# Patient Record
Sex: MALE | Race: WHITE | Employment: PART TIME | URBAN - NONMETROPOLITAN AREA
[De-identification: names, ages, dates, MRNs, and addresses within clinical notes are randomized per-mention and may not be internally consistent; named-entity substitution may affect disease eponyms.]

---
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
## 2020-01-03 ENCOUNTER — TELEPHONE (OUTPATIENT)
Dept: INTERNAL MEDICINE | Age: 20
End: 2020-01-03

## 2020-01-03 NOTE — TELEPHONE ENCOUNTER
Called patient to try to get a records release signed, but the number on file [phone] said it was not a working number. Called the number listed under [employer] he is listed as a guardian and left message for him to sign a records release.

## 2020-03-14 ENCOUNTER — HOSPITAL ENCOUNTER (EMERGENCY)
Age: 20
Discharge: HOME | End: 2020-03-14

## 2020-03-14 VITALS
OXYGEN SATURATION: 96 % | BODY MASS INDEX: 33.86 KG/M2 | SYSTOLIC BLOOD PRESSURE: 134 MMHG | DIASTOLIC BLOOD PRESSURE: 82 MMHG | HEIGHT: 72 IN | TEMPERATURE: 98.1 F | WEIGHT: 250 LBS | RESPIRATION RATE: 17 BRPM | HEART RATE: 86 BPM

## 2020-03-14 LAB
AMPHETAMINE SCREEN, URINE: NEGATIVE
BARBITURATE SCREEN URINE: NEGATIVE
BENZODIAZEPINE SCREEN, URINE: NEGATIVE
CANNABINOID SCREEN URINE: POSITIVE
COCAINE METABOLITE SCREEN URINE: NEGATIVE
Lab: ABNORMAL
OPIATE SCREEN URINE: NEGATIVE

## 2020-03-14 PROCEDURE — 80307 DRUG TEST PRSMV CHEM ANLYZR: CPT

## 2020-03-14 PROCEDURE — 99283 EMERGENCY DEPT VISIT LOW MDM: CPT

## 2020-03-14 PROCEDURE — 6370000000 HC RX 637 (ALT 250 FOR IP): Performed by: NURSE PRACTITIONER

## 2020-03-14 RX ORDER — BACLOFEN 10 MG/1
10 TABLET ORAL 2 TIMES DAILY
Qty: 6 TABLET | Refills: 0 | Status: SHIPPED | OUTPATIENT
Start: 2020-03-14 | End: 2020-03-17

## 2020-03-14 RX ORDER — ONDANSETRON 4 MG/1
4 TABLET, ORALLY DISINTEGRATING ORAL ONCE
Status: COMPLETED | OUTPATIENT
Start: 2020-03-14 | End: 2020-03-14

## 2020-03-14 RX ORDER — SUCRALFATE ORAL 1 G/10ML
1 SUSPENSION ORAL 4 TIMES DAILY
Qty: 1200 ML | Refills: 3 | Status: SHIPPED | OUTPATIENT
Start: 2020-03-14 | End: 2020-03-14 | Stop reason: SDUPTHER

## 2020-03-14 RX ORDER — SUCRALFATE ORAL 1 G/10ML
1 SUSPENSION ORAL 4 TIMES DAILY
Qty: 560 ML | Refills: 0 | Status: SHIPPED | OUTPATIENT
Start: 2020-03-14 | End: 2021-03-03 | Stop reason: ALTCHOICE

## 2020-03-14 RX ORDER — LORAZEPAM 1 MG/1
1 TABLET ORAL ONCE
Status: DISCONTINUED | OUTPATIENT
Start: 2020-03-14 | End: 2020-03-14 | Stop reason: HOSPADM

## 2020-03-14 RX ORDER — METOCLOPRAMIDE 10 MG/1
10 TABLET ORAL ONCE
Status: COMPLETED | OUTPATIENT
Start: 2020-03-14 | End: 2020-03-14

## 2020-03-14 RX ADMIN — ONDANSETRON 4 MG: 4 TABLET, ORALLY DISINTEGRATING ORAL at 10:08

## 2020-03-14 RX ADMIN — METOCLOPRAMIDE 10 MG: 10 TABLET ORAL at 10:08

## 2020-03-14 ASSESSMENT — PAIN SCALES - GENERAL: PAINLEVEL_OUTOF10: 6

## 2020-03-14 ASSESSMENT — ENCOUNTER SYMPTOMS: ABDOMINAL PAIN: 0

## 2020-03-14 NOTE — ED PROVIDER NOTES
[facility] EM[facility] DEPT  eMERGENCY dEPARTMENT eNCOUnter      Pt Name: Juanjose Johnson  MRN: [id number]  Cirilogfmiguel angel 2000  Date of evaluation: 3/14/2020  Provider: Flora Yee, [address]       Chief Complaint   Patient presents with    Hiccups     chronic         HISTORY OF PRESENT ILLNESS   (Location/Symptom, Timing/Onset,Context/Setting, Quality, Duration, Modifying Factors, Severity)  Note limiting factors. Audelia Moody a 23 y.o. male who presents to the emergency department for evaluation of hiccups. Pt tells me that he has had intermittent problems with hiccups since over past couple of years. He tells me that he last experienced problems with hiccups 6 months ago. He tells me that over past 4 days he has had return in problem with hiccups. He relates that the hiccups are causing problems with sleep. He tells me that he as been drinking fluids but hasn't been eating much due to hiccups. He has had indigestion and sore throat associated with this problem. He takes omperazole 40mg daily. He tells me that he thinks he had evaluation by GI 2 years ago but isn't sure. He denies fevers, cough as well as shortness of breath. He tells me that he is anxious. He denies presentation for mental health concerns specifically hallucinations/suicidal thoughts. Pt tells me that Carafate has improved symptoms in the past.   HPI    Nursing Notes were reviewed. REVIEW OF SYSTEMS    (2-9 systems for level 4, 10 or more for level 5)     Review of Systems   Constitutional: Negative. Gastrointestinal: Negative for abdominal pain. Hiccups   Psychiatric/Behavioral: The patient is nervous/anxious. A complete review of systems was performed and is negative except as noted above in the HPI. PAST MEDICAL HISTORY   History reviewed. No pertinent past medical history. SURGICAL HISTORY     History reviewed. No pertinent surgical history.       CURRENT MEDICATIONS       Previous Medications 03/14/2020 11:42:36 AM      PATIENT REFERRED TO:  Owen Everett MD  [address]  [phone]    Schedule an appointment as soon as possible for a visit         DISCHARGE MEDICATIONS:       Current Discharge Medication List      CONTINUE these medications which have CHANGED    Details   sucralfate (CARAFATE) 1 GM/10ML suspension Take 10 mLs by mouth 4 times daily for 14 days  Qty: 560 mL, Refills: 0              Medication List      START taking these medications    baclofen 10 MG tablet  Commonly known as:  LIORESAL  Take 1 tablet by mouth 2 times daily for 3 days     sucralfate 1 GM/10ML suspension  Commonly known as:  Carafate  Take 10 mLs by mouth 4 times daily for 14 days        ASK your doctor about these medications    esomeprazole 40 MG delayed release capsule  Commonly known as:  NEXIUM  Take 1 capsule by mouth every morning (before breakfast)           Where to Get Your Medications      You can get these medications from any pharmacy    Bring a paper prescription for each of these medications  · baclofen 10 MG tablet  · sucralfate 1 GM/10ML suspension         (Pleasenote that portions of this note were completed with a voice recognition program.  Efforts were made to edit the dictations but occasionally words are mis-transcribed.)             Valdez Obando, APRN  03/14/20 [id number]

## 2021-03-03 ENCOUNTER — OFFICE VISIT (OUTPATIENT)
Dept: FAMILY MEDICINE CLINIC | Age: 21
End: 2021-03-03
Payer: MEDICAID

## 2021-03-03 ENCOUNTER — HOSPITAL ENCOUNTER (OUTPATIENT)
Dept: GENERAL RADIOLOGY | Age: 21
Discharge: HOME | End: 2021-03-03
Payer: MEDICAID

## 2021-03-03 VITALS
DIASTOLIC BLOOD PRESSURE: 60 MMHG | OXYGEN SATURATION: 97 % | SYSTOLIC BLOOD PRESSURE: 102 MMHG | WEIGHT: 209.4 LBS | HEIGHT: 72 IN | TEMPERATURE: 97 F | HEART RATE: 81 BPM | RESPIRATION RATE: 16 BRPM | BODY MASS INDEX: 28.36 KG/M2

## 2021-03-03 DIAGNOSIS — R06.6 HICCUPS: ICD-10-CM

## 2021-03-03 DIAGNOSIS — F41.9 ANXIETY: ICD-10-CM

## 2021-03-03 DIAGNOSIS — M54.50 LOW BACK PAIN, UNSPECIFIED BACK PAIN LATERALITY, UNSPECIFIED CHRONICITY, UNSPECIFIED WHETHER SCIATICA PRESENT: ICD-10-CM

## 2021-03-03 DIAGNOSIS — Z13.220 SCREENING FOR HYPERLIPIDEMIA: ICD-10-CM

## 2021-03-03 LAB
ALBUMIN SERPL-MCNC: 4.5 G/DL (ref 3.5–5.2)
ALP BLD-CCNC: 89 U/L (ref 40–130)
ALT SERPL-CCNC: 27 U/L (ref 5–41)
ANION GAP SERPL CALCULATED.3IONS-SCNC: 10 MMOL/L (ref 7–19)
AST SERPL-CCNC: 14 U/L (ref 5–40)
BILIRUB SERPL-MCNC: 1.2 MG/DL (ref 0.2–1.2)
BILIRUBIN DIRECT: 0.2 MG/DL (ref 0–0.3)
BILIRUBIN, INDIRECT: 1 MG/DL (ref 0.1–1)
BUN BLDV-MCNC: 11 MG/DL (ref 6–20)
CALCIUM SERPL-MCNC: 9.6 MG/DL (ref 8.6–10)
CHLORIDE BLD-SCNC: 105 MMOL/L (ref 98–111)
CHOLESTEROL, TOTAL: 139 MG/DL (ref 160–199)
CO2: 27 MMOL/L (ref 22–29)
CREAT SERPL-MCNC: 0.9 MG/DL (ref 0.5–1.2)
GFR AFRICAN AMERICAN: >59
GFR NON-AFRICAN AMERICAN: >60
GLUCOSE BLD-MCNC: 78 MG/DL (ref 74–109)
HCT VFR BLD CALC: 46.4 % (ref 42–52)
HDLC SERPL-MCNC: 33 MG/DL (ref 55–121)
HEMOGLOBIN: 15.2 G/DL (ref 14–18)
LDL CHOLESTEROL CALCULATED: 80 MG/DL
MCH RBC QN AUTO: 28.6 PG (ref 27–31)
MCHC RBC AUTO-ENTMCNC: 32.8 G/DL (ref 33–37)
MCV RBC AUTO: 87.2 FL (ref 80–94)
PDW BLD-RTO: 13 % (ref 11.5–14.5)
PLATELET # BLD: 235 K/UL (ref 130–400)
PMV BLD AUTO: 11 FL (ref 9.4–12.4)
POTASSIUM SERPL-SCNC: 4.3 MMOL/L (ref 3.5–5)
RBC # BLD: 5.32 M/UL (ref 4.7–6.1)
SODIUM BLD-SCNC: 142 MMOL/L (ref 136–145)
T4 FREE: 1.29 NG/DL (ref 0.93–1.7)
TOTAL PROTEIN: 6.7 G/DL (ref 6.6–8.7)
TRIGL SERPL-MCNC: 132 MG/DL (ref 0–149)
TSH SERPL DL<=0.05 MIU/L-ACNC: 0.98 UIU/ML (ref 0.27–4.2)
WBC # BLD: 4.9 K/UL (ref 4.8–10.8)

## 2021-03-03 PROCEDURE — 71046 X-RAY EXAM CHEST 2 VIEWS: CPT

## 2021-03-03 PROCEDURE — 99203 OFFICE O/P NEW LOW 30 MIN: CPT | Performed by: FAMILY MEDICINE

## 2021-03-03 PROCEDURE — 72100 X-RAY EXAM L-S SPINE 2/3 VWS: CPT

## 2021-03-03 RX ORDER — PANTOPRAZOLE SODIUM 40 MG/1
40 TABLET, DELAYED RELEASE ORAL DAILY
Qty: 30 TABLET | Refills: 5 | Status: SHIPPED | OUTPATIENT
Start: 2021-03-03 | End: 2022-03-26

## 2021-03-03 RX ORDER — SUCRALFATE 1 G/1
1 TABLET ORAL 4 TIMES DAILY
Qty: 120 TABLET | Refills: 5 | Status: SHIPPED | OUTPATIENT
Start: 2021-03-03 | End: 2022-03-26

## 2021-03-03 ASSESSMENT — ENCOUNTER SYMPTOMS
CONSTIPATION: 0
NAUSEA: 0
BLOOD IN STOOL: 0
BACK PAIN: 1
CHEST TIGHTNESS: 0
DIARRHEA: 0
VOMITING: 0
SHORTNESS OF BREATH: 0
EYES NEGATIVE: 1
COUGH: 0
WHEEZING: 0

## 2021-03-03 ASSESSMENT — PATIENT HEALTH QUESTIONNAIRE - PHQ9
SUM OF ALL RESPONSES TO PHQ QUESTIONS 1-9: 0
SUM OF ALL RESPONSES TO PHQ9 QUESTIONS 1 & 2: 0
SUM OF ALL RESPONSES TO PHQ QUESTIONS 1-9: 0

## 2021-03-03 NOTE — PROGRESS NOTES
this issue as long as he can remember. He does see Dr. David Patel at [facility]. Apparently he has had some x-rays there but his father suggested to him that we should do some x-rays for him as well. I therefore I am going to get LS spine x-ray as well as a chest x-ray. He was told previously that he does have degenerative disc disease in his low back. He states that he has not had lab for quite some time. He did also mention that he is not a big fan of needles and not wrestled with the idea of whether to do the lab or not. He finally decided to go ahead and have it since he has not eaten and we will do CBC, BMP, lipids, free T4, TSH, urinalysis, and hepatic function panel. Review of Systems   Constitutional: Negative. HENT: Negative. Eyes: Negative. Respiratory: Negative for cough, chest tightness, shortness of breath and wheezing. Cardiovascular: Negative for chest pain, palpitations and leg swelling. Gastrointestinal: Negative for blood in stool, constipation, diarrhea, nausea and vomiting. Genitourinary: Negative for hematuria. Musculoskeletal: Positive for arthralgias and back pain. Skin: Negative. Neurological: Negative. She complains of refractory hiccups but he was not noted to hiccup 1 single time while he was present here in the office today. Psychiatric/Behavioral: Negative. Objective:   Physical Exam  Vitals signs and nursing note reviewed. Constitutional:       General: He is not in acute distress. Appearance: Normal appearance. He is well-developed. He is not ill-appearing, toxic-appearing or diaphoretic. HENT:      Head: Normocephalic and atraumatic. Right Ear: Tympanic membrane, ear canal and external ear normal. There is no impacted cerumen. Left Ear: Tympanic membrane, ear canal and external ear normal. There is no impacted cerumen. Nose: Nose normal.      Mouth/Throat:      Lips: Pink.       Mouth: Mucous membranes are moist.      Dentition: Normal dentition. Tongue: No lesions. Pharynx: Oropharynx is clear. Uvula midline. Tonsils: No tonsillar exudate or tonsillar abscesses. Eyes:      General: Lids are normal. No scleral icterus. Right eye: No discharge. Left eye: No discharge. Extraocular Movements:      Right eye: Normal extraocular motion. Left eye: Normal extraocular motion. Conjunctiva/sclera: Conjunctivae normal.      Right eye: Right conjunctiva is not injected. Left eye: Left conjunctiva is not injected. Pupils: Pupils are equal, round, and reactive to light. Neck:      Musculoskeletal: Normal range of motion and neck supple. No neck rigidity or muscular tenderness. Thyroid: No thyromegaly. Vascular: No carotid bruit or JVD. Cardiovascular:      Rate and Rhythm: Normal rate and regular rhythm. Pulses:           Carotid pulses are 2+ on the right side and 2+ on the left side. Radial pulses are 2+ on the right side and 2+ on the left side. Heart sounds: Normal heart sounds, S1 normal and S2 normal. No murmur. No friction rub. No gallop. Pulmonary:      Effort: Pulmonary effort is normal. No accessory muscle usage or respiratory distress. Breath sounds: Normal breath sounds. No stridor. No wheezing, rhonchi or rales. Chest:      Chest wall: No tenderness. Abdominal:      General: Bowel sounds are normal. There is no distension or abdominal bruit. Palpations: Abdomen is soft. There is no mass. Tenderness: There is no abdominal tenderness. There is no right CVA tenderness, left CVA tenderness, guarding or rebound. Hernia: No hernia is present. Musculoskeletal: Normal range of motion. General: No swelling, tenderness, deformity or signs of injury. Right lower leg: No edema. Left lower leg: No edema. Lymphadenopathy:      Cervical: No cervical adenopathy.       Right cervical: No  T4, Free     Standing Status:   Future     Standing Expiration Date:   3/3/2022    TSH without Reflex     Standing Status:   Future     Standing Expiration Date:   3/3/2022    Urinalysis     Standing Status:   Future     Standing Expiration Date:   3/3/2022    Lipid Panel     Standing Status:   Future     Standing Expiration Date:   3/3/2022     Order Specific Question:   Is Patient Fasting?/# of Hours     Answer:   8    Hepatic Function Panel     Standing Status:   Future     Standing Expiration Date:   3/3/2022     Orders Placed This Encounter   Medications    pantoprazole (PROTONIX) 40 MG tablet     Sig: Take 1 tablet by mouth daily     Dispense:  30 tablet     Refill:  5    sucralfate (CARAFATE) 1 GM tablet     Sig: Take 1 tablet by mouth 4 times daily Ac and hs     Dispense:  120 tablet     Refill:  5             Diana Diamond MD

## 2021-03-05 ENCOUNTER — TELEPHONE (OUTPATIENT)
Dept: FAMILY MEDICINE CLINIC | Age: 21
End: 2021-03-05

## 2021-03-09 ENCOUNTER — OFFICE VISIT (OUTPATIENT)
Dept: FAMILY MEDICINE CLINIC | Age: 21
End: 2021-03-09
Payer: MEDICAID

## 2021-03-09 VITALS
BODY MASS INDEX: 27.77 KG/M2 | HEIGHT: 72 IN | WEIGHT: 205 LBS | HEART RATE: 70 BPM | DIASTOLIC BLOOD PRESSURE: 74 MMHG | TEMPERATURE: 97.2 F | RESPIRATION RATE: 20 BRPM | OXYGEN SATURATION: 98 % | SYSTOLIC BLOOD PRESSURE: 106 MMHG

## 2021-03-09 DIAGNOSIS — R06.6 INTRACTABLE HICCUPS: ICD-10-CM

## 2021-03-09 DIAGNOSIS — M54.50 ACUTE MIDLINE LOW BACK PAIN WITHOUT SCIATICA: ICD-10-CM

## 2021-03-09 PROCEDURE — 99213 OFFICE O/P EST LOW 20 MIN: CPT | Performed by: FAMILY MEDICINE

## 2021-03-09 ASSESSMENT — ENCOUNTER SYMPTOMS
COUGH: 0
DIARRHEA: 0
SHORTNESS OF BREATH: 0
BLOOD IN STOOL: 0
VOMITING: 0
NAUSEA: 0
CONSTIPATION: 0
WHEEZING: 0
BACK PAIN: 1
CHEST TIGHTNESS: 0
EYES NEGATIVE: 1

## 2021-03-09 NOTE — PROGRESS NOTES
mg p.o. daily. Additionally I will give him Carafate 1 g before meals and at bedtime. He did not complain of a significant amount of vomiting at this time so I am not doing Zofran for now. In an effort to rule out some mediastinal lesions, we did order a chest x-ray on his way out the office on 3/3/2021 which was reportedly unremarkable. At his initial visit, the patient was quite upset by the fact that his girlfriend was unable to come back in the room with him. [address] [place] [address] is on restricted exposure precautions and except for situations with the child or mentally challenged, patient's are in the room by themselves as a general safety margin. Eventually got over this and decided to proceed with the work-up as his dad had requested him to do. Admittedly gets upset easily and has some component of anxiety.     Additionally, he complains of pain in his low back. He states that he has had this issue as long as he can remember. He does see Dr. Helen Garcia at [facility]. Apparently he has had some x-rays there but his father suggested to him that we should do some x-rays for him as well. I therefore I am going to get LS spine x-ray as well as a chest x-ray. He was told previously that he does have degenerative disc disease in his low back. There were no acute changes either on his back or his chest x-ray done at his visit on 3/3/2021.     Review of Systems   Constitutional: Negative. HENT: Negative. Eyes: Negative. Respiratory: Negative for cough, chest tightness, shortness of breath and wheezing. Recurrent hiccups reported though not witnessed. Gastrointestinal: Negative for blood in stool, constipation, diarrhea, nausea and vomiting. Genitourinary: Negative for hematuria. Musculoskeletal: Positive for back pain. Skin: Negative. Neurological: Negative. Psychiatric/Behavioral: Negative. Objective:   Physical Exam  Vitals signs and nursing note reviewed. Constitutional:       General: He is not in acute distress. Appearance: Normal appearance. He is well-developed. He is not ill-appearing, toxic-appearing or diaphoretic. HENT:      Head: Normocephalic and atraumatic. Right Ear: Tympanic membrane, ear canal and external ear normal. There is no impacted cerumen. Left Ear: Tympanic membrane, ear canal and external ear normal. There is no impacted cerumen. Nose: Nose normal.      Mouth/Throat:      Lips: Pink. Mouth: Mucous membranes are moist.      Dentition: Normal dentition. Tongue: No lesions. Pharynx: Oropharynx is clear. Uvula midline. Tonsils: No tonsillar exudate or tonsillar abscesses. Eyes:      General: Lids are normal. No scleral icterus. Right eye: No discharge. Left eye: No discharge. Extraocular Movements:      Right eye: Normal extraocular motion. Left eye: Normal extraocular motion. Conjunctiva/sclera: Conjunctivae normal.      Right eye: Right conjunctiva is not injected. Left eye: Left conjunctiva is not injected. Pupils: Pupils are equal, round, and reactive to light. Neck:      Musculoskeletal: Normal range of motion and neck supple. No neck rigidity or muscular tenderness. Thyroid: No thyromegaly. Vascular: No carotid bruit or JVD. Cardiovascular:      Rate and Rhythm: Normal rate and regular rhythm. Pulses:           Carotid pulses are 2+ on the right side and 2+ on the left side. Radial pulses are 2+ on the right side and 2+ on the left side. Heart sounds: Normal heart sounds, S1 normal and S2 normal. No murmur. No friction rub. No gallop. Pulmonary:      Effort: Pulmonary effort is normal. No accessory muscle usage or respiratory distress. Breath sounds: Normal breath sounds. No stridor. No wheezing, rhonchi or rales. Chest:      Chest wall: No tenderness.    Abdominal:      General: Bowel sounds are normal. There is no distension or abdominal bruit. Palpations: Abdomen is soft. There is no mass. Tenderness: There is no abdominal tenderness. There is no right CVA tenderness, left CVA tenderness, guarding or rebound. Hernia: No hernia is present. Musculoskeletal: Normal range of motion. General: No swelling, tenderness, deformity or signs of injury. Right lower leg: No edema. Left lower leg: No edema. Lymphadenopathy:      Cervical: No cervical adenopathy. Right cervical: No superficial cervical adenopathy. Left cervical: No superficial cervical adenopathy. Skin:     General: Skin is warm and dry. Nails: There is no clubbing. Neurological:      General: No focal deficit present. Mental Status: He is alert and oriented to person, place, and time. Mental status is at baseline. Cranial Nerves: No facial asymmetry. Motor: No weakness or tremor. Coordination: Coordination normal.      Gait: Gait normal.      Deep Tendon Reflexes: Reflexes are normal and symmetric. Psychiatric:         Attention and Perception: Attention normal.         Mood and Affect: Mood normal.         Speech: Speech normal.         Behavior: Behavior normal.         Thought Content: Thought content normal.         Cognition and Memory: Memory normal.         Judgment: Judgment normal.         /74 (Site: Left Upper Arm, Position: Sitting, Cuff Size: Medium Adult)   Pulse 70   Temp 97.2 °F (36.2 °C) (Oral)   Resp 20   Ht 6' (1.829 m)   Wt 205 lb (93 kg)   SpO2 98%   BMI 27.80 kg/m²   Assessment:       Diagnosis Orders   1. Intractable hiccups      Reported though not witnessed   2. Acute midline low back pain without sciatica           Plan:       I am having Mr. Beryle Chamorro maintain his :   No outpatient medications have been marked as taking for the 3/9/21 encounter (Office Visit) with Joana Kelly MD.     .            Joana Kelly MD

## 2022-03-26 ENCOUNTER — HOSPITAL ENCOUNTER (EMERGENCY)
Age: 22
Discharge: HOME | End: 2022-03-27
Attending: EMERGENCY MEDICINE
Payer: MEDICAID

## 2022-03-26 DIAGNOSIS — L05.01 PILONIDAL ABSCESS: Primary | ICD-10-CM

## 2022-03-26 PROCEDURE — 10081 I&D PILONIDAL CYST COMP: CPT

## 2022-03-26 PROCEDURE — 99152 MOD SED SAME PHYS/QHP 5/>YRS: CPT

## 2022-03-26 PROCEDURE — 99284 EMERGENCY DEPT VISIT MOD MDM: CPT

## 2022-03-27 VITALS
HEART RATE: 90 BPM | DIASTOLIC BLOOD PRESSURE: 81 MMHG | OXYGEN SATURATION: 100 % | HEIGHT: 73 IN | RESPIRATION RATE: 17 BRPM | WEIGHT: 180 LBS | TEMPERATURE: 97.9 F | SYSTOLIC BLOOD PRESSURE: 112 MMHG | BODY MASS INDEX: 23.86 KG/M2

## 2022-03-27 PROCEDURE — 2500000003 HC RX 250 WO HCPCS: Performed by: EMERGENCY MEDICINE

## 2022-03-27 RX ORDER — OXYCODONE AND ACETAMINOPHEN 7.5; 325 MG/1; MG/1
1 TABLET ORAL EVERY 6 HOURS PRN
Qty: 10 TABLET | Refills: 0 | Status: SHIPPED | OUTPATIENT
Start: 2022-03-27 | End: 2022-04-03

## 2022-03-27 RX ORDER — ETOMIDATE 2 MG/ML
10 INJECTION INTRAVENOUS ONCE
Status: DISCONTINUED | OUTPATIENT
Start: 2022-03-27 | End: 2022-03-27 | Stop reason: HOSPADM

## 2022-03-27 RX ORDER — ETOMIDATE 2 MG/ML
INJECTION INTRAVENOUS DAILY PRN
Status: COMPLETED | OUTPATIENT
Start: 2022-03-27 | End: 2022-03-27

## 2022-03-27 RX ORDER — SULFAMETHOXAZOLE AND TRIMETHOPRIM 800; 160 MG/1; MG/1
1 TABLET ORAL 2 TIMES DAILY
Qty: 14 TABLET | Refills: 0 | Status: SHIPPED | OUTPATIENT
Start: 2022-03-27 | End: 2022-04-03

## 2022-03-27 RX ADMIN — ETOMIDATE 10 MG: 2 INJECTION, SOLUTION INTRAVENOUS at 01:10

## 2022-03-27 RX ADMIN — ETOMIDATE 10 MG: 2 INJECTION, SOLUTION INTRAVENOUS at 01:07

## 2022-03-27 ASSESSMENT — ENCOUNTER SYMPTOMS
ABDOMINAL PAIN: 0
VOMITING: 0

## 2022-03-27 NOTE — ED PROVIDER NOTES
[facility]  eMERGENCY dEPARTMENT eNCOUnter      Pt Name: Evonne Lockett  MRN: [id number]  [place] 2000  Date of evaluation: 3/26/2022  Provider: Jayla Murray MD    [address] [place] [address]       Chief Complaint   Patient presents with    Abscess     on tailbone x 1 week         HISTORY OF PRESENT ILLNESS   (Location/Symptom, Timing/Onset,Context/Setting, Quality, Duration, Modifying Factors, Severity)  Note limiting factors. Evonne Lockett is a 24 y.o. male who presents to the emergency department for evaluation regarding swelling and redness over his buttock area for about the past 1 week. Patient states he has a previous history of similar abscesses in this area. He tells me that normally they drain on their own however this 1 seems to be getting progressively worse over the last 1 week with really no improvement in symptoms. He has not been on any oral antibiotics. States has not had any fevers or chills. There have been no relieving factors. HPI    NursingNotes were reviewed. REVIEW OF SYSTEMS    (2-9 systems for level 4, 10 or more for level 5)     Review of Systems   Constitutional: Negative for chills and fever. Gastrointestinal: Negative for abdominal pain and vomiting. Skin: Positive for wound. All other systems reviewed and are negative. PAST MEDICALHISTORY   History reviewed. No pertinent past medical history. SURGICAL HISTORY       Past Surgical History:   Procedure Laterality Date    TONSILLECTOMY           CURRENT MEDICATIONS     Previous Medications    No medications on file       ALLERGIES     Patient has no known allergies. FAMILY HISTORY     History reviewed. No pertinent family history.        SOCIAL HISTORY       Social History     Socioeconomic History    Marital status: Single     Spouse name: None    Number of children: None    Years of education: None    Highest education level: None   Occupational History    None   Tobacco Use    Smoking status: Former Smoker    Smokeless tobacco: Current User     Types: Chew, Snuff   Substance and Sexual Activity    Alcohol use: No    Drug use: No    Sexual activity: None   Other Topics Concern    None   Social History Narrative    None     Social Determinants of Health     Financial Resource Strain:     Difficulty of Paying Living Expenses: Not on file   Food Insecurity:     Worried About Running Out of Food in the Last Year: Not on file    [place] of Food in the Last Year: Not on file   Transportation Needs:     Lack of Transportation (Medical): Not on file    Lack of Transportation (Non-Medical):  Not on file   Physical Activity:     Days of Exercise per Week: Not on file    Minutes of Exercise per Session: Not on file   Stress:     Feeling of Stress : Not on file   Social Connections:     Frequency of Communication with Friends and Family: Not on file    Frequency of Social Gatherings with Friends and Family: Not on file    Attends Scientology Services: Not on file    Active Member of [address] [place] [address] or Organizations: Not on file    Attends Club or Organization Meetings: Not on file    Marital Status: Not on file   Intimate Partner Violence:     Fear of Current or Ex-Partner: Not on file    Emotionally Abused: Not on file    Physically Abused: Not on file    Sexually Abused: Not on file   Housing Stability:     Unable to Pay for Housing in the Last Year: Not on file    Number of [place] in the Last Year: Not on file    Unstable Housing in the Last Year: Not on file       SCREENINGS    Jeanine Coma Scale  Eye Opening: Spontaneous  Best Verbal Response: Oriented  Best Motor Response: Obeys commands  [place] Coma Scale Score: 15        PHYSICAL EXAM    (up to 7 for level 4, 8 or more for level 5)     ED Triage Vitals   BP Temp Temp Source Pulse Resp SpO2 Height Weight   03/26/22 2010 03/26/22 2008 03/26/22 2008 03/26/22 2008 03/26/22 2008 03/26/22 2008 03/26/22 2008 03/26/22 2008   132/68 97.9 °F (36.6 °C) Infrared 113 19 98 % 6' 1\" (1.854 m) 180 lb (81.6 kg)       Physical Exam  Vitals and nursing note reviewed. HENT:      Mouth/Throat:      Mouth: Mucous membranes are not dry. Cardiovascular:      Rate and Rhythm: Normal rate and regular rhythm. Heart sounds: Normal heart sounds. Pulmonary:      Effort: Pulmonary effort is normal. No respiratory distress. Breath sounds: Normal breath sounds. Abdominal:      Palpations: Abdomen is soft. Tenderness: There is no abdominal tenderness. Skin:            Comments: Erythema, fluctuance and warmth at the top of the gluteal cleft. There is no evidence of extension onto the lower buttock area or perineum. This appears consistent with a pilonidal abscess. Neurological:      Mental Status: He is alert and oriented to person, place, and time. DIAGNOSTIC RESULTS         LABS:  Labs Reviewed - No data to display    All other labs were within normal range or not returned as of this dictation. EMERGENCY DEPARTMENT COURSE and DIFFERENTIAL DIAGNOSIS/MDM:   Vitals:    Vitals:    03/27/22 0107 03/27/22 0111 03/27/22 0115 03/27/22 0118   BP: 121/65 101/69 119/84 119/84   Pulse:  101 121 90   Resp:  18 15 20   Temp:       TempSrc:       SpO2:  96% 98% 100%   Weight:       Height:           MDM      PROCEDURES:  Unless otherwise noted below, none     Incision/Drainage    Date/Time: 3/27/2022 1:20 AM  Performed by: Graciela Beaulieu MD  Authorized by: Graciela Beaulieu MD     Consent:     Consent obtained:  Written    Consent given by:  Patient    Risks discussed:  Pain, incomplete drainage and bleeding    Alternatives discussed:  No treatment  Location:     Type:  Pilonidal cyst    Location:  Anogenital    Anogenital location:  Gluteal cleft  Pre-procedure details:     Skin preparation:  Betadine  Sedation:     Sedation type: Moderate (conscious) sedation  Anesthesia (see MAR for exact dosages):      Anesthesia method:  Local infiltration    Local anesthetic:  Lidocaine 1% w/o epi  Procedure type:     Complexity:  Complex  Procedure details:     Incision types:  Stab incision    Scalpel blade:  11    Wound management:  Probed and deloculated    Drainage:  Purulent    Drainage amount:  Copious    Wound treatment:  Wound left open    Packing materials:  1/4 in iodoform gauze  Post-procedure details:     Patient tolerance of procedure: Tolerated well, no immediate complications  Procedural sedation    Date/Time: 3/27/2022 1:27 AM  Performed by: Wing Greg MD  Authorized by: Wing Greg MD     Consent:     Consent obtained:  Written    Risks discussed:  Inadequate sedation, respiratory compromise necessitating ventilatory assistance and intubation and vomiting    Alternatives discussed:  Analgesia without sedation  Indications:     Procedure performed:  Incision and drainage    Procedure necessitating sedation performed by:  Physician performing sedation    Intended level of sedation:  Moderate (conscious sedation)  Pre-sedation assessment:     Mallampati score:  I - soft palate, uvula, fauces, pillars visible    Pre-sedation assessments completed and reviewed: anesthesia/sedation history    Immediate pre-procedure details:     Verified: bag valve mask available, intubation equipment available, IV patency confirmed, oxygen available and suction available    Procedure details (see MAR for exact dosages):     Preoxygenation:  Nasal cannula    Sedation:  Etomidate    Intra-procedure monitoring:  Continuous pulse oximetry, cardiac monitor, frequent vital sign checks and blood pressure monitoring    Intra-procedure events: none    Post-procedure details:     Attendance: Constant attendance by certified staff until patient recovered      Recovery: Patient returned to pre-procedure baseline      Patient tolerance: Tolerated well, no immediate complications        FINAL IMPRESSION      1.  Pilonidal abscess          DISPOSITION/PLAN   DISPOSITION Decision To Discharge 03/27/2022 01:21:33 AM      PATIENT REFERRED TO:  [facility] EMERGENCY DEPT  [address] [address] [phone]  In 3 days  For wound re-check    DO Marcus August [address]  [facility] [address] [address] [phone]            DISCHARGE MEDICATIONS:  New Prescriptions    OXYCODONE-ACETAMINOPHEN (PERCOCET) 7.5-325 MG PER TABLET    Take 1 tablet by mouth every 6 hours as needed for Pain for up to 7 days.     SULFAMETHOXAZOLE-TRIMETHOPRIM (BACTRIM DS) 800-160 MG PER TABLET    Take 1 tablet by mouth 2 times daily for 7 days          (Please note that portions of this note were completed with a voice recognition program.  Efforts were made to edit thedictations but occasionally words are mis-transcribed.)    Jayla Murray MD (electronically signed)  Attending Emergency Physician         Jayla Murray MD  03/27/22 3488

## 2022-03-29 ENCOUNTER — HOSPITAL ENCOUNTER (EMERGENCY)
Age: 22
Discharge: HOME | End: 2022-03-29
Payer: MEDICAID

## 2022-03-29 VITALS
RESPIRATION RATE: 18 BRPM | TEMPERATURE: 98.1 F | SYSTOLIC BLOOD PRESSURE: 180 MMHG | DIASTOLIC BLOOD PRESSURE: 88 MMHG | OXYGEN SATURATION: 100 % | HEART RATE: 117 BPM

## 2022-03-29 DIAGNOSIS — Z51.89 WOUND CHECK, ABSCESS: Primary | ICD-10-CM

## 2022-03-29 PROCEDURE — 99283 EMERGENCY DEPT VISIT LOW MDM: CPT

## 2022-03-29 ASSESSMENT — ENCOUNTER SYMPTOMS
APNEA: 0
EYE ITCHING: 0
PHOTOPHOBIA: 0
EYE DISCHARGE: 0
SHORTNESS OF BREATH: 0
COLOR CHANGE: 0
BACK PAIN: 0
COUGH: 0

## 2022-03-29 NOTE — ED PROVIDER NOTES
[facility] EMERGENCY DEPT  eMERGENCYdEPARTMENT eNCOUnter      Pt Name: Terrie Stockton  MRN: [id number]  [place] 2000  Date of evaluation: 3/29/2022  Provider:FREDY Hicks    CHIEF COMPLAINT       Chief Complaint   Patient presents with    Wound Check     Needs dressing changed for back abscess          HISTORY OF PRESENT ILLNESS  (Location/Symptom, Timing/Onset, Context/Setting, Quality, Duration, Modifying Factors, Severity.)   Terrie Stockton is a 24 y.o. male who presents to the emergency department with complaints of pilonidal abscess that had been drained and packed here for wound check. No complaints swelling and pain resolving on abx at this time. This is not his first pilonidal abscess. HPI    Nursing Notes were reviewed and I agree. REVIEW OF SYSTEMS    (2-9 systems for level 4, 10 or more for level 5)     Review of Systems   Constitutional: Negative for activity change, appetite change, chills and fever. HENT: Negative for congestion and dental problem. Eyes: Negative for photophobia, discharge and itching. Respiratory: Negative for apnea, cough and shortness of breath. Cardiovascular: Negative for chest pain. Musculoskeletal: Negative for arthralgias, back pain, gait problem, myalgias and neck pain. Skin: Positive for wound. Negative for color change, pallor and rash. Neurological: Negative for dizziness, seizures and syncope. Psychiatric/Behavioral: Negative for agitation. The patient is not nervous/anxious. Except as noted above the remainder of the review of systems was reviewed and negative. PAST MEDICAL HISTORY   History reviewed. No pertinent past medical history. SURGICAL HISTORY       Past Surgical History:   Procedure Laterality Date    TONSILLECTOMY           CURRENT MEDICATIONS       Previous Medications    OXYCODONE-ACETAMINOPHEN (PERCOCET) 7.5-325 MG PER TABLET    Take 1 tablet by mouth every 6 hours as needed for Pain for up to 7 days. SULFAMETHOXAZOLE-TRIMETHOPRIM (BACTRIM DS) 800-160 MG PER TABLET    Take 1 tablet by mouth 2 times daily for 7 days       ALLERGIES     Patient has no known allergies. FAMILY HISTORY     History reviewed. No pertinent family history. SOCIAL HISTORY       Social History     Socioeconomic History    Marital status: Single     Spouse name: None    Number of children: None    Years of education: None    Highest education level: None   Occupational History    None   Tobacco Use    Smoking status: Former Smoker    Smokeless tobacco: Current User     Types: Chew, Snuff   Substance and Sexual Activity    Alcohol use: No    Drug use: No    Sexual activity: None   Other Topics Concern    None   Social History Narrative    None     Social Determinants of Health     Financial Resource Strain:     Difficulty of Paying Living Expenses: Not on file   Food Insecurity:     Worried About Running Out of Food in the Last Year: Not on file    [place] of Food in the Last Year: Not on file   Transportation Needs:     Lack of Transportation (Medical): Not on file    Lack of Transportation (Non-Medical):  Not on file   Physical Activity:     Days of Exercise per Week: Not on file    Minutes of Exercise per Session: Not on file   Stress:     Feeling of Stress : Not on file   Social Connections:     Frequency of Communication with Friends and Family: Not on file    Frequency of Social Gatherings with Friends and Family: Not on file    Attends Buddhist Services: Not on file    Active Member of Clubs or Organizations: Not on file    Attends Club or Organization Meetings: Not on file    Marital Status: Not on file   Intimate Partner Violence:     Fear of Current or Ex-Partner: Not on file    Emotionally Abused: Not on file    Physically Abused: Not on file    Sexually Abused: Not on file   Housing Stability:     Unable to Pay for Housing in the Last Year: Not on file    Number of [place] in the Last Year: Not on file    Unstable Housing in the Last Year: Not on file       SCREENINGS    Jeanine Coma Scale  Eye Opening: Spontaneous  Best Verbal Response: Oriented  Best Motor Response: Obeys commands  Jean[place] Coma Scale Score: 15      PHYSICAL EXAM    (up to 7 forlevel 4, 8 or more for level 5)     ED Triage Vitals [03/29/22 1300]   BP Temp Temp Source Pulse Resp SpO2 Height Weight   (!) 180/88 98.1 °F (36.7 °C) Oral 117 18 100 % -- --       Physical Exam  Vitals and nursing note reviewed. Constitutional:       General: He is not in acute distress. Appearance: Normal appearance. He is well-developed. He is not diaphoretic. HENT:      Head: Normocephalic and atraumatic. Right Ear: Tympanic membrane, ear canal and external ear normal.      Left Ear: Tympanic membrane, ear canal and external ear normal.      Nose: Nose normal.      Mouth/Throat:      Mouth: Mucous membranes are moist.   Eyes:      Pupils: Pupils are equal, round, and reactive to light. Neck:      Trachea: No tracheal deviation. Cardiovascular:      Rate and Rhythm: Normal rate and regular rhythm. Heart sounds: Normal heart sounds. No murmur heard. Pulmonary:      Effort: Pulmonary effort is normal.      Breath sounds: Normal breath sounds. No stridor. No wheezing. Chest:      Chest wall: No tenderness. Abdominal:      General: Bowel sounds are normal. There is no distension. Palpations: Abdomen is soft. Tenderness: There is no abdominal tenderness. Musculoskeletal:         General: Normal range of motion. Cervical back: Normal range of motion and neck supple. Legs:    Skin:     General: Skin is warm and dry. Capillary Refill: Capillary refill takes less than 2 seconds. Neurological:      Mental Status: He is alert and oriented to person, place, and time. Psychiatric:         Mood and Affect: Mood normal.         Behavior: Behavior normal.         Thought Content:  Thought content normal. Judgment: Judgment normal.           DIAGNOSTIC RESULTS     RADIOLOGY:   Non-plain film images such as CT, Ultrasound and MRI are read by the radiologist. Plain radiographic images are visualized and preliminarilyinterpreted by No att. providers found with the below findings:        Interpretation per the Radiologist below, if available at the time of this note:    No orders to display       LABS:  Labs Reviewed - No data to display    All other labs were within normal range or notreturned as of this dictation. RE-ASSESSMENT        EMERGENCY DEPARTMENT COURSE and DIFFERENTIAL DIAGNOSIS/MDM:   Vitals:    Vitals:    03/29/22 1300   BP: (!) 180/88   Pulse: 117   Resp: 18   Temp: 98.1 °F (36.7 °C)   TempSrc: Oral   SpO2: 100%       MDM  I removed the packing and irrigated with saline currently bleeding at this time but no purulent discharge patient tolerates procedure well. He has appropriate pain control and antibiotics to utilize they have Dr. Sesar Wise number with general surgery they are on call for close follow-up on Friday. Plan will be for discharge. Warm compress twice daily education plan for apply new bandage we encourage is to continue draining and bleeding. PROCEDURES:    Procedures      FINAL IMPRESSION      1.  Wound check, abscess          DISPOSITION/PLAN   DISPOSITION        PATIENT REFERRED TO:  [address] [place] [address] EMERGENCY DEPT  [facility]  [phone]    If symptoms worsen    DO Marcus Joshi [address] [place] [address]  [phone]    Schedule an appointment as soon as possible for a visit       Nidia Morton MD  [address] [place] [address]  [phone]      general referral needed      DISCHARGE MEDICATIONS:  New Prescriptions    No medications on file       (Please note that portions of this note were completed with a voice recognition program.  Efforts were made to edit the dictations but occasionallywords are mis-transcribed.)    FREDY Hemphill [place]  03/29/22 6869

## 2022-05-25 ENCOUNTER — OFFICE VISIT (OUTPATIENT)
Dept: PRIMARY CARE CLINIC | Age: 22
End: 2022-05-25
Payer: MEDICAID

## 2022-05-25 VITALS
HEIGHT: 73 IN | SYSTOLIC BLOOD PRESSURE: 110 MMHG | TEMPERATURE: 97.9 F | RESPIRATION RATE: 18 BRPM | HEART RATE: 115 BPM | WEIGHT: 189.4 LBS | BODY MASS INDEX: 25.1 KG/M2 | OXYGEN SATURATION: 96 % | DIASTOLIC BLOOD PRESSURE: 72 MMHG

## 2022-05-25 DIAGNOSIS — R06.6 CHRONIC HICCUPS: Primary | ICD-10-CM

## 2022-05-25 DIAGNOSIS — Z13.31 POSITIVE DEPRESSION SCREENING: ICD-10-CM

## 2022-05-25 DIAGNOSIS — F41.8 DEPRESSION WITH ANXIETY: ICD-10-CM

## 2022-05-25 PROCEDURE — 99203 OFFICE O/P NEW LOW 30 MIN: CPT | Performed by: NURSE PRACTITIONER

## 2022-05-25 RX ORDER — FLUOXETINE HYDROCHLORIDE 20 MG/1
20 CAPSULE ORAL DAILY
Qty: 30 CAPSULE | Refills: 3 | Status: SHIPPED | OUTPATIENT
Start: 2022-05-25 | End: 2022-06-17 | Stop reason: ALTCHOICE

## 2022-05-25 RX ORDER — METOCLOPRAMIDE 10 MG/1
10 TABLET ORAL 4 TIMES DAILY
Qty: 120 TABLET | Refills: 3 | Status: SHIPPED | OUTPATIENT
Start: 2022-05-25 | End: 2022-06-17 | Stop reason: ALTCHOICE

## 2022-05-25 RX ORDER — OMEPRAZOLE 40 MG/1
40 CAPSULE, DELAYED RELEASE ORAL DAILY
Qty: 90 CAPSULE | Refills: 3 | Status: SHIPPED | OUTPATIENT
Start: 2022-05-25 | End: 2022-09-03 | Stop reason: SDUPTHER

## 2022-05-25 RX ORDER — OMEPRAZOLE 40 MG/1
40 CAPSULE, DELAYED RELEASE ORAL DAILY
COMMUNITY
End: 2022-05-25 | Stop reason: SDUPTHER

## 2022-05-25 ASSESSMENT — PATIENT HEALTH QUESTIONNAIRE - PHQ9
2. FEELING DOWN, DEPRESSED OR HOPELESS: 3
4. FEELING TIRED OR HAVING LITTLE ENERGY: 2
3. TROUBLE FALLING OR STAYING ASLEEP: 3
10. IF YOU CHECKED OFF ANY PROBLEMS, HOW DIFFICULT HAVE THESE PROBLEMS MADE IT FOR YOU TO DO YOUR WORK, TAKE CARE OF THINGS AT HOME, OR GET ALONG WITH OTHER PEOPLE: 1
1. LITTLE INTEREST OR PLEASURE IN DOING THINGS: 3
SUM OF ALL RESPONSES TO PHQ QUESTIONS 1-9: 18
SUM OF ALL RESPONSES TO PHQ QUESTIONS 1-9: 18
5. POOR APPETITE OR OVEREATING: 3
8. MOVING OR SPEAKING SO SLOWLY THAT OTHER PEOPLE COULD HAVE NOTICED. OR THE OPPOSITE, BEING SO FIGETY OR RESTLESS THAT YOU HAVE BEEN MOVING AROUND A LOT MORE THAN USUAL: 2
6. FEELING BAD ABOUT YOURSELF - OR THAT YOU ARE A FAILURE OR HAVE LET YOURSELF OR YOUR FAMILY DOWN: 2
9. THOUGHTS THAT YOU WOULD BE BETTER OFF DEAD, OR OF HURTING YOURSELF: 0
7. TROUBLE CONCENTRATING ON THINGS, SUCH AS READING THE NEWSPAPER OR WATCHING TELEVISION: 0
SUM OF ALL RESPONSES TO PHQ QUESTIONS 1-9: 18
SUM OF ALL RESPONSES TO PHQ9 QUESTIONS 1 & 2: 6
SUM OF ALL RESPONSES TO PHQ QUESTIONS 1-9: 18

## 2022-05-25 ASSESSMENT — ENCOUNTER SYMPTOMS: EYES NEGATIVE: 1

## 2022-05-25 NOTE — PROGRESS NOTES
[address] [facility] [address] [address]  Dept: [phone]  Dept Fax: [phone]  Loc: [phone]    Stephen Mccollum is a 24 y.o. male who presents today for his medical conditions/complaints as noted below. Stephen Mccollum is c/o of New Patient (Pt is here as a new pt to establish care. ), Depression (Pt is here to discuss his depression. Pt is currently not on anything and has never been on anything.), and Hiccups (Pt states he has had chronic hiccups for the past 2 almost 3 years. He states n  has helped him with these.)        HPI:     HPI   Chief Complaint   Patient presents with    New Patient     Pt is here as a new pt to establish care.  Depression     Pt is here to discuss his depression. Pt is currently not on anything and has never been on anything.  Hiccups     Pt states he has had chronic hiccups for the past 2 almost 3 years. He states lupe sanchez has helped him with these. Former patient of Dr. Starr Mckenzie. His wife used to be a patient of mine accepted him back in the practice today. He has a history of chronic hiccups which she has been seen by Dr. Kellie Riggs at the [facility] for in the past.  He was started on omeprazole which did help for a while and then they have gradually come back. He does not currently have a job. He is  and they are living with his mother. They do not have any children. He feels both depressed and anxiety. He denies any suicidal thoughts. He does have a strong family history of bipolar and thinks that he might have bipolar. He and his wife both agree he has extreme highs and extreme lows. He is more anxious than depressed.   He does get up and take care of some animals that they have every day  Past Medical History:   Diagnosis Date    Chronic hiccups     GERD (gastroesophageal reflux disease)       Past Surgical History:   Procedure Laterality Date    TONSILLECTOMY         Vitals 5/25/2022 3/29/2022 3/27/2022 3/27/2022 3/27/2022 2/40/5760   SYSTOLIC 510 009 740 99 970 013   DIASTOLIC 72 88 81 70 80 81   Site Left Upper Arm - - - - -   Position Sitting - - - - -   Cuff Size Medium Adult - - - - -   Pulse 115 117 90 92 110 99   Temp 97.9 98.1 - - - -   Resp 18 18 17 22 15 13   SpO2 96 100 100 100 98 100   Weight 189 lb 6.4 oz - - - - -   Height 6' 1\" - - - - -   Body mass index 24.99 kg/m2 - - - - -   Pain Level - - - - - -   Some recent data might be hidden       Family History   Problem Relation Age of Onset    High Blood Pressure Mother     Cancer Mother         cervical cancer    Hypertension Father     Bipolar Disorder Father     High Blood Pressure Father     Bipolar Disorder Maternal Grandfather     High Blood Pressure Maternal Grandfather     Cancer Paternal Grandmother         stomach cancer    Bipolar Disorder Paternal Grandfather     High Blood Pressure Paternal Grandfather     Cancer Paternal Grandfather         testicular       Social History     Tobacco Use    Smoking status: Former Smoker     Packs/day: 0.50     Years: 2.00     Pack years: 1.00     Types: Cigarettes     Quit date: 2019     Years since quitting: 3.3    Smokeless tobacco: Former User     Types: Chew, Snuff   Substance Use Topics    Alcohol use: Yes     Comment: special occasions      No current outpatient medications on file prior to visit. No current facility-administered medications on file prior to visit.      No Known Allergies    Health Maintenance   Topic Date Due    HIV screen  Never done    Hepatitis C screen  Never done    Depression Monitoring  03/03/2022    Varicella vaccine (1 of 2 - 2-dose childhood series) 06/15/2022 (Originally 9/16/2001)    HPV vaccine (1 - Male 2-dose series) 07/19/2022 (Originally 9/16/2011)    DTaP/Tdap/Td vaccine (1 - Tdap) 05/25/2023 (Originally 9/16/2019)    COVID-19 Vaccine (1) 05/31/2029 (Originally 9/16/2005)    Flu vaccine (Season Ended) 09/01/2022    Hepatitis A vaccine  Aged Out    Hepatitis B vaccine  Aged Out    Hib vaccine  Aged Out    Meningococcal (ACWY) vaccine  Aged Out    Pneumococcal 0-64 years Vaccine  Aged Out       Subjective:      Review of Systems   Constitutional: Negative. HENT: Negative. Eyes: Negative. Gastrointestinal:        Hiccup   Psychiatric/Behavioral: Positive for dysphoric mood. Negative for agitation, behavioral problems, confusion, decreased concentration, hallucinations, self-injury, sleep disturbance and suicidal ideas. The patient is nervous/anxious. The patient is not hyperactive. Objective:     Physical Exam  Vitals and nursing note reviewed. Constitutional:       Appearance: He is well-developed. HENT:      Head: Normocephalic. Right Ear: Tympanic membrane and external ear normal.      Left Ear: Tympanic membrane and external ear normal.   Neck:      Vascular: No carotid bruit. Cardiovascular:      Rate and Rhythm: Normal rate and regular rhythm. Heart sounds: Normal heart sounds. Pulmonary:      Effort: Pulmonary effort is normal.      Breath sounds: Normal breath sounds. Skin:     General: Skin is warm and dry. Capillary Refill: Capillary refill takes less than 2 seconds. Neurological:      General: No focal deficit present. Mental Status: He is alert and oriented to person, place, and time. Psychiatric:         Mood and Affect: Mood normal.         Behavior: Behavior normal.         Thought Content: Thought content normal.         Judgment: Judgment normal.     he did randomly have hiccups during visit today. /72 (Site: Left Upper Arm, Position: Sitting, Cuff Size: Medium Adult)   Pulse (!) 115   Temp 97.9 °F (36.6 °C) (Temporal)   Resp 18   Ht 6' 1\" (1.854 m)   Wt 189 lb 6.4 oz (85.9 kg)   SpO2 96%   BMI 24.99 kg/m²     Assessment:       Diagnosis Orders   1. Chronic hiccups  Ginna Melendez, DAKOTAH, Gastroenterology, [place]   2.  Positive depression screening 3. Depression with anxiety  [facility], [place], [place], [place]         Plan:   More than 50% of the time was spent counseling and coordinating care for a total time of 20 30min face to face. PDMP Monitoring:    Last PDMP Bud Martins as Reviewed [facility]):  Review User Review Instant Review Result            Urine Drug Screenings (1 yr)     URINE DRUG SCREEN  Collected: 3/14/2020 11:20 AM (Final result)    Complete Results              Medication Contract and Consent for Opioid Use Documents Filed      No documents found                 Patient given educational materials -see patient instructions. Discussed use, benefit, and side effects of prescribed medications. All patient questions answered. Pt voiced understanding. Reviewed health maintenance. Instructed to continue currentmedications, diet and exercise. Patient agreed with treatment plan. Follow up as directed. MEDICATIONS:  Orders Placed This Encounter   Medications    metoclopramide (REGLAN) 10 MG tablet     Sig: Take 1 tablet by mouth 4 times daily For hiccups     Dispense:  120 tablet     Refill:  3    omeprazole (PRILOSEC) 40 MG delayed release capsule     Sig: Take 1 capsule by mouth daily     Dispense:  90 capsule     Refill:  3    FLUoxetine (PROZAC) 20 MG capsule     Sig: Take 1 capsule by mouth daily     Dispense:  30 capsule     Refill:  3         ORDERS:  Orders Placed This Encounter   Procedures   [address] [place] [address] Rd, CNP, [address], APRN, Gastroenterology, [place]       Follow-up:  Return in about 4 weeks (around 6/22/2022) for check  meds. PATIENT INSTRUCTIONS:  Patient Instructions     Continue omeprazole and add the reglan  [facility] to [facility] for chronic [facility]iccups  Start the medication, prozac. You must take this medication daily. It will take about 10 days for you to notice a difference. If depression worsens or no emotion stop the medication and call me.  The most common side effect is some nausea but this should subside in a few days. Natural ways to deal with stress is counseling, yoga, mediation ,exercise , and cleaning up diet to avoid process foods. Avoid smoking pot. [facility] for [facility] analysis     Patient Education        Hiccups: Care Instructions  Your Care Instructions     Hiccups occur when a spasm contracts the diaphragm, a large sheet of muscle that separates the chest cavity from the abdominal cavity. The spasm causes an intake of breath that is suddenly stopped by the closure of the vocal cords(glottis). This closure causes the \"hiccup\" sound. A very full stomach can cause hiccups that go away on their own. A full stomach can be caused by things like eating too much food too quickly or swallowing toomuch air. Most hiccups go away on their own within a few minutes to a few hours and donot require any treatment. Hiccups that last longer than 48 hours are called persistent hiccups. Hiccups that last longer than a month are called intractable hiccups. Both persistentand intractable hiccups may be a sign of a more serious health problem. Follow-up care is a key part of your treatment and safety. Be sure to make and go to all appointments, and call your doctor if you are having problems. It's also a good idea to know your test results and keep alist of the medicines you take. How can you care for yourself at home?  Try these safe and easy home remedies if your hiccups are making you uncomfortable. ? Eat a teaspoon of sugar or honey. ? Hold your breath and count slowly to 10.  ? Quickly drink a glass of cold water.  If your doctor prescribed medicine, take it as directed. Call your doctor if you think you are having a problem with your medicine. To help prevent hiccups    Take steps to avoid swallowing air:  ? Eat slowly. Avoid gulping food or beverages. ? Chew your food thoroughly before you swallow. ? Avoid drinking through a straw. ?  Avoid chewing gum or eating hard candy.  ? Do not smoke or use other tobacco products. ? If you wear dentures, check with a dentist to make sure they fit properly.  Do not eat large meals.  Do not drink alcohol.  Avoid sudden changes in stomach temperature, such as drinking a hot beverage and then a cold beverage.  Avoid emotional stress or excitement. When should you call for help? Call your doctor now or seek immediate medical care if:     You have trouble swallowing and are unable to swallow food or fluids.      You have hiccups for more than 2 days.      You have new symptoms, such as belly pain, constipation, diarrhea, heartburn, or vomiting. Watch closely for changes in your health, and be sure to contact your doctor if:     You have trouble swallowing but are able to swallow food and fluids.      Hiccups occur often and get in the way of your activities.      You think medicine may be causing your symptoms.      You do not get better as expected. Where can you learn more? Go to https://M3X Media.WorkFlex Solutions. org and sign in to your Aidin account. Enter P404 in the Server Density box to learn more about \"Hiccups: Care Instructions. \"     If you do not have an account, please click on the \"Sign Up Now\" link. Current as of: July 1, 2021               Content Version: 13.2  © 2006-2022 Healthwise, Incorporated. Care instructions adapted under license by [facility] ([facility]). If you have questions about a medical condition or this instruction, always ask your healthcare professional. [place] [address] any warranty or liability for your use of this information. Electronically signed by DAKOTAH Tavera CNP on 5/25/2022 at 12:39 PM    EMR Dragon/transcription disclaimer:  Much of thisencounter note is electronic transcription/translation of spoken language to printed texts.   The electronic translation of spoken language may be erroneous, or at times, nonsensical words or phrases may be inadvertentlytranscribed. Although I have reviewed the note for such errors, some may still exist.PHQ-9 score today: (PHQ-9 Total Score: 18), additional evaluation and assessment performed, follow-up plan includes but not limited to: Medication management and Referral to /Specialist  for evaluation and management.

## 2022-05-25 NOTE — PATIENT INSTRUCTIONS
Continue omeprazole and add the reglan  Refer to [facility] for chronic hiccups  Start the medication, prozac. You must take this medication daily. It will take about 10 days for you to notice a difference. If depression worsens or no emotion stop the medication and call me. The most common side effect is some nausea but this should subside in a few days. Natural ways to deal with stress is counseling, yoga, mediation ,exercise , and cleaning up diet to avoid process foods. Avoid smoking pot. [facility] to [facility] for [facility] analysis     Patient Education        Hiccups: Care Instructions  Your Care Instructions     Hiccups occur when a spasm contracts the diaphragm, a large sheet of muscle that separates the chest cavity from the abdominal cavity. The spasm causes an intake of breath that is suddenly stopped by the closure of the vocal cords(glottis). This closure causes the \"hiccup\" sound. A very full stomach can cause hiccups that go away on their own. A full stomach can be caused by things like eating too much food too quickly or swallowing toomuch air. Most hiccups go away on their own within a few minutes to a few hours and donot require any treatment. Hiccups that last longer than 48 hours are called persistent hiccups. Hiccups that last longer than a month are called intractable hiccups. Both persistentand intractable hiccups may be a sign of a more serious health problem. Follow-up care is a key part of your treatment and safety. Be sure to make and go to all appointments, and call your doctor if you are having problems. It's also a good idea to know your test results and keep alist of the medicines you take. How can you care for yourself at home?  Try these safe and easy home remedies if your hiccups are making you uncomfortable. ? Eat a teaspoon of sugar or honey. ? Hold your breath and count slowly to 10.  ? Quickly drink a glass of cold water.    If your doctor prescribed medicine, take it as directed. Call your doctor if you think you are having a problem with your medicine. To help prevent hiccups    Take steps to avoid swallowing air:  ? Eat slowly. Avoid gulping food or beverages. ? Chew your food thoroughly before you swallow. ? Avoid drinking through a straw. ? Avoid chewing gum or eating hard candy. ? Do not smoke or use other tobacco products. ? If you wear dentures, check with a dentist to make sure they fit properly.  Do not eat large meals.  Do not drink alcohol.  Avoid sudden changes in stomach temperature, such as drinking a hot beverage and then a cold beverage.  Avoid emotional stress or excitement. When should you call for help? Call your doctor now or seek immediate medical care if:     You have trouble swallowing and are unable to swallow food or fluids.      You have hiccups for more than 2 days.      You have new symptoms, such as belly pain, constipation, diarrhea, heartburn, or vomiting. Watch closely for changes in your health, and be sure to contact your doctor if:     You have trouble swallowing but are able to swallow food and fluids.      Hiccups occur often and get in the way of your activities.      You think medicine may be causing your symptoms.      You do not get better as expected. Where can you learn more? Go to https://Pandoo TEK.QuantaLife. org and sign in to your Vendly account. Enter B522 in the Hot[employer]ur [employer] box to learn more about \"Hiccups: Care Instructions. \"     If you do not have an account, please click on the \"Sign Up Now\" link. Current as of: July 1, 2021               Content Version: 13.2  © 2006-2022 Healthwise, Incorporated. Care instructions adapted under license by [facility] ([facility]). If you have questions about a medical condition or this instruction, always ask your healthcare professional. [place] [address] any warranty or liability for your use of this information.

## 2022-05-26 ENCOUNTER — TELEPHONE (OUTPATIENT)
Dept: PSYCHIATRY | Age: 22
End: 2022-05-26

## 2022-05-26 NOTE — TELEPHONE ENCOUNTER
Called pt to schedule an appt From a referral    Scheduled with Aris Torre on 7/18 @ 3    Electronically signed by Allegra Pradhan MA on 5/26/22 at 3:20 PM

## 2022-06-11 ENCOUNTER — APPOINTMENT (OUTPATIENT)
Dept: GENERAL RADIOLOGY | Age: 22
End: 2022-06-11
Payer: MEDICAID

## 2022-06-11 ENCOUNTER — HOSPITAL ENCOUNTER (EMERGENCY)
Age: 22
Discharge: HOME | End: 2022-06-11
Payer: MEDICAID

## 2022-06-11 VITALS
RESPIRATION RATE: 20 BRPM | TEMPERATURE: 97.6 F | BODY MASS INDEX: 25.42 KG/M2 | HEIGHT: 73 IN | SYSTOLIC BLOOD PRESSURE: 118 MMHG | DIASTOLIC BLOOD PRESSURE: 79 MMHG | WEIGHT: 191.8 LBS | HEART RATE: 94 BPM | OXYGEN SATURATION: 96 %

## 2022-06-11 DIAGNOSIS — S61.411A LACERATION OF RIGHT HAND WITHOUT FOREIGN BODY, INITIAL ENCOUNTER: ICD-10-CM

## 2022-06-11 DIAGNOSIS — S60.221A CONTUSION OF RIGHT HAND, INITIAL ENCOUNTER: Primary | ICD-10-CM

## 2022-06-11 PROCEDURE — 6370000000 HC RX 637 (ALT 250 FOR IP): Performed by: PHYSICIAN ASSISTANT

## 2022-06-11 PROCEDURE — 90471 IMMUNIZATION ADMIN: CPT | Performed by: PHYSICIAN ASSISTANT

## 2022-06-11 PROCEDURE — 73130 X-RAY EXAM OF HAND: CPT

## 2022-06-11 PROCEDURE — 2500000003 HC RX 250 WO HCPCS: Performed by: PHYSICIAN ASSISTANT

## 2022-06-11 PROCEDURE — 99283 EMERGENCY DEPT VISIT LOW MDM: CPT

## 2022-06-11 PROCEDURE — 90715 TDAP VACCINE 7 YRS/> IM: CPT | Performed by: PHYSICIAN ASSISTANT

## 2022-06-11 PROCEDURE — 73130 X-RAY EXAM OF HAND: CPT | Performed by: RADIOLOGY

## 2022-06-11 PROCEDURE — 6360000002 HC RX W HCPCS: Performed by: PHYSICIAN ASSISTANT

## 2022-06-11 RX ORDER — PREDNISONE 1 MG/1
10 TABLET ORAL ONCE
Status: COMPLETED | OUTPATIENT
Start: 2022-06-11 | End: 2022-06-11

## 2022-06-11 RX ORDER — CEPHALEXIN 500 MG/1
500 CAPSULE ORAL 2 TIMES DAILY
Qty: 14 CAPSULE | Refills: 0 | Status: SHIPPED | OUTPATIENT
Start: 2022-06-11 | End: 2022-06-17

## 2022-06-11 RX ORDER — PREDNISONE 10 MG/1
10 TABLET ORAL DAILY
Qty: 10 TABLET | Refills: 0 | Status: SHIPPED | OUTPATIENT
Start: 2022-06-11 | End: 2022-06-17

## 2022-06-11 RX ORDER — LIDOCAINE HYDROCHLORIDE AND EPINEPHRINE 10; 10 MG/ML; UG/ML
20 INJECTION, SOLUTION INFILTRATION; PERINEURAL ONCE
Status: COMPLETED | OUTPATIENT
Start: 2022-06-11 | End: 2022-06-11

## 2022-06-11 RX ORDER — KETOROLAC TROMETHAMINE 30 MG/ML
30 INJECTION, SOLUTION INTRAMUSCULAR; INTRAVENOUS ONCE
Status: DISCONTINUED | OUTPATIENT
Start: 2022-06-11 | End: 2022-06-11 | Stop reason: HOSPADM

## 2022-06-11 RX ADMIN — LIDOCAINE HYDROCHLORIDE,EPINEPHRINE BITARTRATE 20 ML: 10; .01 INJECTION, SOLUTION INFILTRATION; PERINEURAL at 19:08

## 2022-06-11 RX ADMIN — PREDNISONE 10 MG: 5 TABLET ORAL at 19:57

## 2022-06-11 RX ADMIN — Medication 3 ML: at 19:07

## 2022-06-11 ASSESSMENT — ENCOUNTER SYMPTOMS
PHOTOPHOBIA: 0
COUGH: 0
EYE DISCHARGE: 0
COLOR CHANGE: 0
SHORTNESS OF BREATH: 0
EYE ITCHING: 0
BACK PAIN: 0
APNEA: 0

## 2022-06-11 ASSESSMENT — PAIN DESCRIPTION - LOCATION: LOCATION: HAND

## 2022-06-11 ASSESSMENT — PAIN SCALES - GENERAL: PAINLEVEL_OUTOF10: 6

## 2022-06-11 ASSESSMENT — PAIN DESCRIPTION - ORIENTATION: ORIENTATION: RIGHT

## 2022-06-11 ASSESSMENT — PAIN DESCRIPTION - DESCRIPTORS: DESCRIPTORS: BURNING;CRUSHING
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
## 2022-06-12 NOTE — ED NOTES
3 steri strips placed to laceration on right hand, hand wounds dressed with abx ointment, 4x4s, and nikolai.  Pt tolerated well       Rogelio Negrete RN  06/11/22 2028

## 2022-06-17 ENCOUNTER — OFFICE VISIT (OUTPATIENT)
Dept: PRIMARY CARE CLINIC | Age: 22
End: 2022-06-17
Payer: MEDICAID

## 2022-06-17 VITALS
WEIGHT: 186 LBS | HEIGHT: 73 IN | HEART RATE: 85 BPM | DIASTOLIC BLOOD PRESSURE: 63 MMHG | SYSTOLIC BLOOD PRESSURE: 100 MMHG | BODY MASS INDEX: 24.65 KG/M2 | TEMPERATURE: 98 F | OXYGEN SATURATION: 97 % | RESPIRATION RATE: 16 BRPM

## 2022-06-17 DIAGNOSIS — F39 MOOD DISORDER (HCC): ICD-10-CM

## 2022-06-17 DIAGNOSIS — R06.6 CHRONIC HICCUPS: ICD-10-CM

## 2022-06-17 DIAGNOSIS — F41.8 DEPRESSION WITH ANXIETY: Primary | ICD-10-CM

## 2022-06-17 PROCEDURE — 99214 OFFICE O/P EST MOD 30 MIN: CPT | Performed by: NURSE PRACTITIONER

## 2022-06-17 RX ORDER — SUCRALFATE 1 G/1
1 TABLET ORAL 4 TIMES DAILY
Qty: 120 TABLET | Refills: 3 | Status: SHIPPED | OUTPATIENT
Start: 2022-06-17

## 2022-06-17 RX ORDER — ARIPIPRAZOLE 5 MG/1
5 TABLET ORAL DAILY
Qty: 30 TABLET | Refills: 3 | Status: SHIPPED | OUTPATIENT
Start: 2022-06-17 | End: 2022-09-27 | Stop reason: SDUPTHER

## 2022-06-17 RX ORDER — TRAZODONE HYDROCHLORIDE 50 MG/1
TABLET ORAL
Qty: 60 TABLET | Refills: 5 | Status: SHIPPED | OUTPATIENT
Start: 2022-06-17 | End: 2022-08-02 | Stop reason: SDUPTHER

## 2022-06-17 ASSESSMENT — ENCOUNTER SYMPTOMS
GASTROINTESTINAL NEGATIVE: 1
EYES NEGATIVE: 1
RESPIRATORY NEGATIVE: 1
ALLERGIC/IMMUNOLOGIC NEGATIVE: 1
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
## 2022-06-17 NOTE — PATIENT INSTRUCTIONS
prozac stop today, start abilify  Start the trazodone tonight and may take 1 to 2 PO for sleep and depression   stop the reglan and start the carfate  Make f/u appt with GI doctor  Keep appt with CATHY tovar, at [facility] psych  Refer for counseling. Work on stop smoking weed.

## 2022-07-05 DIAGNOSIS — R82.5 POSITIVE URINE DRUG SCREEN: Primary | ICD-10-CM

## 2022-07-09 LAB — CANNABINOIDS CONF, URINE: 24 NG/ML

## 2022-07-15 ENCOUNTER — TELEPHONE (OUTPATIENT)
Dept: PSYCHIATRY | Age: 22
End: 2022-07-15

## 2022-07-15 NOTE — TELEPHONE ENCOUNTER
Called pt for appointment reminder.     -Pt confirmed      Electronically signed by Deyvi Wooten MA on 7/15/2022 at 2:51 PM

## 2022-07-18 ENCOUNTER — OFFICE VISIT (OUTPATIENT)
Dept: PSYCHIATRY | Age: 22
End: 2022-07-18
Payer: MEDICAID

## 2022-07-18 VITALS
TEMPERATURE: 97.7 F | DIASTOLIC BLOOD PRESSURE: 62 MMHG | SYSTOLIC BLOOD PRESSURE: 110 MMHG | HEIGHT: 73 IN | WEIGHT: 191.2 LBS | BODY MASS INDEX: 25.34 KG/M2 | HEART RATE: 111 BPM | OXYGEN SATURATION: 95 %

## 2022-07-18 DIAGNOSIS — F39 MOOD DISORDER (HCC): Primary | ICD-10-CM

## 2022-07-18 PROCEDURE — 99215 OFFICE O/P EST HI 40 MIN: CPT | Performed by: NURSE PRACTITIONER

## 2022-07-18 ASSESSMENT — PATIENT HEALTH QUESTIONNAIRE - PHQ9
2. FEELING DOWN, DEPRESSED OR HOPELESS: 0
SUM OF ALL RESPONSES TO PHQ QUESTIONS 1-9: 0
SUM OF ALL RESPONSES TO PHQ QUESTIONS 1-9: 0
7. TROUBLE CONCENTRATING ON THINGS, SUCH AS READING THE NEWSPAPER OR WATCHING TELEVISION: 0
1. LITTLE INTEREST OR PLEASURE IN DOING THINGS: 0
6. FEELING BAD ABOUT YOURSELF - OR THAT YOU ARE A FAILURE OR HAVE LET YOURSELF OR YOUR FAMILY DOWN: 0
SUM OF ALL RESPONSES TO PHQ9 QUESTIONS 1 & 2: 0
8. MOVING OR SPEAKING SO SLOWLY THAT OTHER PEOPLE COULD HAVE NOTICED. OR THE OPPOSITE, BEING SO FIGETY OR RESTLESS THAT YOU HAVE BEEN MOVING AROUND A LOT MORE THAN USUAL: 0
10. IF YOU CHECKED OFF ANY PROBLEMS, HOW DIFFICULT HAVE THESE PROBLEMS MADE IT FOR YOU TO DO YOUR WORK, TAKE CARE OF THINGS AT HOME, OR GET ALONG WITH OTHER PEOPLE: 0
SUM OF ALL RESPONSES TO PHQ QUESTIONS 1-9: 0
9. THOUGHTS THAT YOU WOULD BE BETTER OFF DEAD, OR OF HURTING YOURSELF: 0
3. TROUBLE FALLING OR STAYING ASLEEP: 0
4. FEELING TIRED OR HAVING LITTLE ENERGY: 0
5. POOR APPETITE OR OVEREATING: 0
SUM OF ALL RESPONSES TO PHQ QUESTIONS 1-9: 0
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
## 2022-07-26 ENCOUNTER — OFFICE VISIT (OUTPATIENT)
Dept: PRIMARY CARE CLINIC | Age: 22
End: 2022-07-26
Payer: MEDICAID

## 2022-07-26 VITALS
BODY MASS INDEX: 24.94 KG/M2 | WEIGHT: 188.2 LBS | DIASTOLIC BLOOD PRESSURE: 70 MMHG | TEMPERATURE: 98.4 F | OXYGEN SATURATION: 98 % | SYSTOLIC BLOOD PRESSURE: 122 MMHG | HEIGHT: 73 IN | HEART RATE: 102 BPM

## 2022-07-26 DIAGNOSIS — L29.9 ITCHING OF EAR: Primary | ICD-10-CM

## 2022-07-26 PROCEDURE — 99203 OFFICE O/P NEW LOW 30 MIN: CPT | Performed by: NURSE PRACTITIONER

## 2022-07-26 ASSESSMENT — PATIENT HEALTH QUESTIONNAIRE - PHQ9
SUM OF ALL RESPONSES TO PHQ QUESTIONS 1-9: 0
1. LITTLE INTEREST OR PLEASURE IN DOING THINGS: 0
2. FEELING DOWN, DEPRESSED OR HOPELESS: 0
SUM OF ALL RESPONSES TO PHQ9 QUESTIONS 1 & 2: 0

## 2022-07-26 ASSESSMENT — ENCOUNTER SYMPTOMS
RHINORRHEA: 0
ALLERGIC/IMMUNOLOGIC NEGATIVE: 1
COUGH: 0
RESPIRATORY NEGATIVE: 1
SORE THROAT: 0
GASTROINTESTINAL NEGATIVE: 1
EYES NEGATIVE: 1

## 2022-07-26 NOTE — PROGRESS NOTES
[address] [facility] [address]  [address] [address]  Dept: [phone]  Dept Fax: [phone]  Loc: [phone]    Angi Dickey is a 24 y.o. male who presents today for his medical conditions/complaints as noted below.   Angi Dickey is c/o of Cerumen Impaction        HPI:     HPI   Chief Complaint   Patient presents with    Cerumen Impaction     Past Medical History:   Diagnosis Date    Anxiety     Chronic hiccups     Depression     GERD (gastroesophageal reflux disease)       Past Surgical History:   Procedure Laterality Date    TONSILLECTOMY         Vitals 7/26/2022 7/18/2022 6/17/2022 6/11/2022 6/11/2022 1/37/1388   SYSTOLIC 397 020 444 910 - 969   DIASTOLIC 70 62 63 79 - 78   Site - - - - - -   Position - - - - - -   Cuff Size - - - - - -   Pulse 102 111 85 94 - 120   Temp 98.4 97.7 98 - - 97.6   Resp - - 16 20 - 18   SpO2 98 95 97 96 - 95   Weight 188 lb 3.2 oz 191 lb 3.2 oz 186 lb - - 191 lb 12.8 oz   Height 6' 1\" 6' 1\" 6' 1\" - - 6' 1\"   Body mass index 24.83 kg/m2 25.22 kg/m2 24.54 kg/m2 - - 25.3 kg/m2   Pain Level - - - - 6 -   Some recent data might be hidden       Family History   Problem Relation Age of Onset    High Blood Pressure Mother     Cancer Mother         cervical cancer    Hypertension Father     Bipolar Disorder Father     High Blood Pressure Father     Bipolar Disorder Maternal Grandfather     High Blood Pressure Maternal Grandfather     Cancer Paternal Grandmother         stomach cancer    Bipolar Disorder Paternal Grandfather     High Blood Pressure Paternal Grandfather     Cancer Paternal Grandfather         testicular       Social History     Tobacco Use    Smoking status: Former     Packs/day: 0.50     Years: 2.00     Pack years: 1.00     Types: Cigarettes     Quit date: 2019     Years since quitting: 3.5    Smokeless tobacco: Former     Types: Chew, Snuff   Substance Use Topics    Alcohol use: Yes     Comment: special occasions      Current Outpatient Medications on File Prior to Visit   Medication Sig Dispense Refill    traZODone (DESYREL) 50 MG tablet 1-2 PO at HS for sleep and depression 60 tablet 5    ARIPiprazole (ABILIFY) 5 MG tablet Take 1 tablet by mouth daily 30 tablet 3    sucralfate (CARAFATE) 1 GM tablet Take 1 tablet by mouth 4 times daily 120 tablet 3    omeprazole (PRILOSEC) 40 MG delayed release capsule Take 1 capsule by mouth daily 90 capsule 3     No current facility-administered medications on file prior to visit. No Known Allergies    Health Maintenance   Topic Date Due    Varicella vaccine (1 of 2 - 2-dose childhood series) Never done    Pneumococcal 0-64 years Vaccine (1 - PCV) Never done    HPV vaccine (1 - Male 2-dose series) Never done    HIV screen  Never done    Hepatitis C screen  Never done    DTaP/Tdap/Td vaccine (1 - Tdap) 05/25/2023 (Originally 9/16/2019)    COVID-19 Vaccine (1) 05/31/2029 (Originally 3/16/2001)    Flu vaccine (1) 09/01/2022    Depression Monitoring  07/26/2023    Hepatitis A vaccine  Aged Out    Hepatitis B vaccine  Aged Out    Hib vaccine  Aged Out    Meningococcal (ACWY) vaccine  Aged Out       Subjective:      Review of Systems   Constitutional: Negative. Negative for fever and unexpected weight change. HENT:  Positive for ear pain. Negative for congestion, hearing loss, postnasal drip, rhinorrhea, sore throat and tinnitus. Eyes: Negative. Respiratory: Negative. Negative for cough. Cardiovascular: Negative. Gastrointestinal: Negative. Endocrine: Negative. Genitourinary: Negative. Musculoskeletal: Negative. Skin: Negative. Allergic/Immunologic: Negative. Neurological: Negative. Hematological: Negative. Psychiatric/Behavioral: Negative. Objective:     Physical Exam  Vitals and nursing note reviewed. Constitutional:       General: He is not in acute distress. Appearance: Normal appearance. He is not ill-appearing or toxic-appearing.    HENT: Head: Normocephalic and atraumatic. Right Ear: Tympanic membrane, ear canal and external ear normal.      Left Ear: Tympanic membrane, ear canal and external ear normal.      Nose: Nose normal.      Mouth/Throat:      Mouth: Mucous membranes are moist.   Eyes:      Pupils: Pupils are equal, round, and reactive to light. Cardiovascular:      Rate and Rhythm: Normal rate and regular rhythm. Pulses: Normal pulses. Heart sounds: Normal heart sounds. Pulmonary:      Effort: Pulmonary effort is normal. No respiratory distress. Breath sounds: Normal breath sounds. No wheezing, rhonchi or rales. Abdominal:      General: Bowel sounds are normal.      Palpations: Abdomen is soft. Musculoskeletal:         General: Normal range of motion. Cervical back: Normal range of motion and neck supple. Skin:     General: Skin is warm and dry. Coloration: Skin is not jaundiced or pale. Findings: No erythema or rash. Neurological:      Mental Status: He is alert and oriented to person, place, and time. Mental status is at baseline. Psychiatric:         Mood and Affect: Mood normal.         Behavior: Behavior normal.         Thought Content: Thought content normal.         Judgment: Judgment normal.     /70   Pulse (!) 102   Temp 98.4 °F (36.9 °C)   Ht 6' 1\" (1.854 m)   Wt 188 lb 3.2 oz (85.4 kg)   SpO2 98%   BMI 24.83 kg/m²     Assessment:       Diagnosis Orders   1. Itching of ear              Plan:   Irrigate ears in office today. May use Debrox as needed to keep ears clean  . Do not use Q-tips       Patient given educational materials -see patient instructions. Discussed use, benefit, and side effects of prescribed medications. All patient questions answered. Pt voiced understanding. Reviewed health maintenance. Instructed to continue currentmedications, diet and exercise. Patient agreed with treatment plan. Follow up as directed.    MEDICATIONS:  No orders of the defined types were placed in this encounter. ORDERS:  No orders of the defined types were placed in this encounter. Follow-up:  Return if symptoms worsen or fail to improve. PATIENT INSTRUCTIONS:  There are no Patient Instructions on file for this visit. Electronically signed by DAKOTAH Camacho NP on 7/28/2022 at 7:50 AM    EMR Dragon/transcription disclaimer:  Much of thisencounter note is electronic transcription/translation of spoken language to printed texts. The electronic translation of spoken language may be erroneous, or at times, nonsensical words or phrases may be inadvertentlytranscribed.   Although I have reviewed the note for such errors, some may still exist.

## 2022-08-03 RX ORDER — TRAZODONE HYDROCHLORIDE 50 MG/1
TABLET ORAL
Qty: 60 TABLET | Refills: 5 | Status: SHIPPED | OUTPATIENT
Start: 2022-08-03 | End: 2022-09-15 | Stop reason: SDUPTHER

## 2022-09-06 RX ORDER — OMEPRAZOLE 40 MG/1
40 CAPSULE, DELAYED RELEASE ORAL DAILY
Qty: 90 CAPSULE | Refills: 3 | Status: SHIPPED | OUTPATIENT
Start: 2022-09-06 | End: 2022-10-19 | Stop reason: SDUPTHER

## 2022-09-15 ENCOUNTER — OFFICE VISIT (OUTPATIENT)
Dept: PRIMARY CARE CLINIC | Age: 22
End: 2022-09-15
Payer: MEDICAID

## 2022-09-15 VITALS
DIASTOLIC BLOOD PRESSURE: 70 MMHG | TEMPERATURE: 97.1 F | BODY MASS INDEX: 24.65 KG/M2 | HEART RATE: 86 BPM | SYSTOLIC BLOOD PRESSURE: 120 MMHG | WEIGHT: 186 LBS | HEIGHT: 73 IN | OXYGEN SATURATION: 97 %

## 2022-09-15 DIAGNOSIS — R06.6 CHRONIC HICCUPS: ICD-10-CM

## 2022-09-15 DIAGNOSIS — F41.8 DEPRESSION WITH ANXIETY: ICD-10-CM

## 2022-09-15 DIAGNOSIS — F39 MOOD DISORDER (HCC): ICD-10-CM

## 2022-09-15 DIAGNOSIS — G47.00 INSOMNIA, UNSPECIFIED TYPE: Primary | ICD-10-CM

## 2022-09-15 PROCEDURE — 99213 OFFICE O/P EST LOW 20 MIN: CPT | Performed by: NURSE PRACTITIONER

## 2022-09-15 RX ORDER — DARIDOREXANT 50 MG/1
TABLET, FILM COATED ORAL
Qty: 30 TABLET | Refills: 2 | Status: SHIPPED | OUTPATIENT
Start: 2022-09-15

## 2022-09-15 RX ORDER — TRAZODONE HYDROCHLORIDE 50 MG/1
TABLET ORAL
Qty: 60 TABLET | Refills: 5 | Status: SHIPPED | OUTPATIENT
Start: 2022-09-15 | End: 2022-10-24 | Stop reason: SDUPTHER

## 2022-09-15 SDOH — ECONOMIC STABILITY: FOOD INSECURITY: WITHIN THE PAST 12 MONTHS, YOU WORRIED THAT YOUR FOOD WOULD RUN OUT BEFORE YOU GOT MONEY TO BUY MORE.: SOMETIMES TRUE

## 2022-09-15 SDOH — ECONOMIC STABILITY: FOOD INSECURITY: WITHIN THE PAST 12 MONTHS, THE FOOD YOU BOUGHT JUST DIDN'T LAST AND YOU DIDN'T HAVE MONEY TO GET MORE.: SOMETIMES TRUE

## 2022-09-15 ASSESSMENT — PATIENT HEALTH QUESTIONNAIRE - PHQ9
SUM OF ALL RESPONSES TO PHQ QUESTIONS 1-9: 4
2. FEELING DOWN, DEPRESSED OR HOPELESS: 0
SUM OF ALL RESPONSES TO PHQ QUESTIONS 1-9: 4
1. LITTLE INTEREST OR PLEASURE IN DOING THINGS: 0
SUM OF ALL RESPONSES TO PHQ9 QUESTIONS 1 & 2: 0
SUM OF ALL RESPONSES TO PHQ QUESTIONS 1-9: 4
5. POOR APPETITE OR OVEREATING: 0
9. THOUGHTS THAT YOU WOULD BE BETTER OFF DEAD, OR OF HURTING YOURSELF: 0
8. MOVING OR SPEAKING SO SLOWLY THAT OTHER PEOPLE COULD HAVE NOTICED. OR THE OPPOSITE, BEING SO FIGETY OR RESTLESS THAT YOU HAVE BEEN MOVING AROUND A LOT MORE THAN USUAL: 2
7. TROUBLE CONCENTRATING ON THINGS, SUCH AS READING THE NEWSPAPER OR WATCHING TELEVISION: 0
6. FEELING BAD ABOUT YOURSELF - OR THAT YOU ARE A FAILURE OR HAVE LET YOURSELF OR YOUR FAMILY DOWN: 0
3. TROUBLE FALLING OR STAYING ASLEEP: 2
SUM OF ALL RESPONSES TO PHQ QUESTIONS 1-9: 4
10. IF YOU CHECKED OFF ANY PROBLEMS, HOW DIFFICULT HAVE THESE PROBLEMS MADE IT FOR YOU TO DO YOUR WORK, TAKE CARE OF THINGS AT HOME, OR GET ALONG WITH OTHER PEOPLE: 1
4. FEELING TIRED OR HAVING LITTLE ENERGY: 0

## 2022-09-15 ASSESSMENT — SOCIAL DETERMINANTS OF HEALTH (SDOH): HOW HARD IS IT FOR YOU TO PAY FOR THE VERY BASICS LIKE FOOD, HOUSING, MEDICAL CARE, AND HEATING?: SOMEWHAT HARD

## 2022-09-15 NOTE — PROGRESS NOTES
[address] [facility]  [address]  [address] [address]  Dept: [phone]  Dept Fax: [phone]  Loc: [phone]    Darwin Giraldo is a 24 y.o. male who presents today for his medical conditions/complaints as noted below. Darwin Giraldo is c/o of Follow-up (Depression with anxiety - depression seems better but anxiety and insomnia still present.  ) and Hiccups (Patient needs a new referral placed as he was unable to keep last appointment. Referral from 5/2022 is good for one year - patient will get appointment rescheduled.  )        HPI:     HPI   Chief Complaint   Patient presents with    Follow-up     Depression with anxiety - depression seems better but anxiety and insomnia still present. Hiccups     Patient needs a new referral placed as he was unable to keep last appointment. Referral from 5/2022 is good for one year - patient will get appointment rescheduled. He is seeing Makayla Perez his last appointment was July 18 and he has an appointment coming up with him on October 17. He said he is tried melatonin and Benadryl for sleep he finally went up to 175 mg on the trazodone and he still not sleeping.   Past Medical History:   Diagnosis Date    Anxiety     Chronic hiccups     Depression     GERD (gastroesophageal reflux disease)       Past Surgical History:   Procedure Laterality Date    TONSILLECTOMY         Vitals 9/15/2022 7/26/2022 7/18/2022 6/17/2022 6/11/2022 5/20/4073   SYSTOLIC 351 554 402 181 532 -   DIASTOLIC 70 70 62 63 79 -   Site - - - - - -   Position - - - - - -   Cuff Size - - - - - -   Pulse 86 102 111 85 94 -   Temp 97.1 98.4 97.7 98 - -   Resp - - - 16 20 -   SpO2 97 98 95 97 96 -   Weight 186 lb 188 lb 3.2 oz 191 lb 3.2 oz 186 lb - -   Height 6' 1\" 6' 1\" 6' 1\" 6' 1\" - -   Body mass index 24.54 kg/m2 24.83 kg/m2 25.22 kg/m2 24.54 kg/m2 - -   Pain Level - - - - - 6   Some recent data might be hidden       Family History   Problem Relation Age of Onset    High Blood Pressure Mother     Cancer Mother         cervical cancer    Hypertension Father     Bipolar Disorder Father     High Blood Pressure Father     Bipolar Disorder Maternal Grandfather     High Blood Pressure Maternal Grandfather     Cancer Paternal Grandmother         stomach cancer    Bipolar Disorder Paternal Grandfather     High Blood Pressure Paternal Grandfather     Cancer Paternal Grandfather         testicular       Social History     Tobacco Use    Smoking status: Former     Packs/day: 0.50     Years: 2.00     Pack years: 1.00     Types: Cigarettes     Quit date: 2019     Years since quitting: 3.7    Smokeless tobacco: Former     Types: Chew, Snuff   Substance Use Topics    Alcohol use: Yes     Comment: special occasions      Current Outpatient Medications on File Prior to Visit   Medication Sig Dispense Refill    omeprazole (PRILOSEC) 40 MG delayed release capsule Take 1 capsule by mouth daily 90 capsule 3    ARIPiprazole (ABILIFY) 5 MG tablet Take 1 tablet by mouth daily 30 tablet 3    sucralfate (CARAFATE) 1 GM tablet Take 1 tablet by mouth 4 times daily 120 tablet 3     No current facility-administered medications on file prior to visit. No Known Allergies    Health Maintenance   Topic Date Due    Varicella vaccine (1 of 2 - 2-dose childhood series) Never done    Pneumococcal 0-64 years Vaccine (1 - PCV) Never done    HPV vaccine (1 - Male 2-dose series) Never done    HIV screen  Never done    Hepatitis C screen  Never done    Flu vaccine (1) Never done    DTaP/Tdap/Td vaccine (1 - Tdap) 05/25/2023 (Originally 9/16/2019)    COVID-19 Vaccine (1) 05/31/2029 (Originally 3/16/2001)    Depression Monitoring  07/26/2023    Hepatitis A vaccine  Aged Out    Hepatitis B vaccine  Aged Out    Hib vaccine  Aged Out    Meningococcal (ACWY) vaccine  Aged Out       Subjective:      Review of Systems   Constitutional: Negative. Psychiatric/Behavioral:  Positive for sleep disturbance. Objective:     Physical Exam  Vitals and nursing note reviewed. Constitutional:       Appearance: Normal appearance. He is well-developed. HENT:      Head: Normocephalic. Cardiovascular:      Rate and Rhythm: Normal rate and regular rhythm. Heart sounds: Normal heart sounds. Pulmonary:      Effort: Pulmonary effort is normal.      Breath sounds: Normal breath sounds. Skin:     General: Skin is warm and dry. Capillary Refill: Capillary refill takes less than 2 seconds. Neurological:      General: No focal deficit present. Mental Status: He is alert and oriented to person, place, and time. Psychiatric:         Mood and Affect: Mood normal.         Behavior: Behavior normal.         Thought Content: Thought content normal.         Judgment: Judgment normal.     /70   Pulse 86   Temp 97.1 °F (36.2 °C)   Ht 6' 1\" (1.854 m)   Wt 186 lb (84.4 kg)   SpO2 97%   BMI 24.54 kg/m²     Assessment:       Diagnosis Orders   1. Insomnia, unspecified type  Daridorexant HCl (QUVIVIQ) 50 MG TABS      2. Chronic hiccups        3. Depression with anxiety        4. [facility] ([facility]              Plan:   More than 50% of the time was spent counseling and coordinating care for a total time of 20min face to face. PDMP Monitoring:    Last PDMP [place] as Reviewed:  Review User Review Instant Review Result            Urine Drug Screenings (1 yr)       URINE DRUG SCREEN  Collected: 3/14/2020 11:20 AM (Final result)              Cannabinoid, Urine, Confirmation  Collected: 7/5/2022  3:28 PM (Final result)                  Medication Contract and Consent for Opioid Use Documents Filed       Patient Documents       Type of Document Status Date Received Received By Description    Medication Contract Received 7/18/2022  3:01 PM Kristi Olson                      Patient given educational materials -see patient instructions. Discussed use, benefit, and side effects of prescribed medications.   All patient questions answered. Pt voiced understanding. Reviewed health maintenance. Instructed to continue currentmedications, diet and exercise. Patient agreed with treatment plan. Follow up as directed. MEDICATIONS:  Orders Placed This Encounter   Medications    Daridorexant HCl (QUVIVIQ) 50 MG TABS     Si p.o. 30 minutes before bedtime. Dispense:  30 tablet     Refill:  2    traZODone (DESYREL) 50 MG tablet     Si-2 PO at HS for sleep and depression     Dispense:  60 tablet     Refill:  5         ORDERS:  No orders of the defined types were placed in this encounter. Follow-up:  Return in about 6 months (around 3/15/2023) for pe, give zabrina gi phone  n umber for appt. PATIENT INSTRUCTIONS:  Patient Instructions   Make an appointment with GI doctor  Brock Patterson is a new sleep medicine we should be able to get you through the mail order pharmacy [facility] they will contact you about this. You take it 30 minutes before sleeping. Back off on the trazodone 100 mg at night. Continue with your appointment with psychiatry  Electronically signed by DAKOTAH Love CNP on 9/15/2022 at 11:42 AM    EMR Dragon/transcription disclaimer:  Much of thisencounter note is electronic transcription/translation of spoken language to printed texts. The electronic translation of spoken language may be erroneous, or at times, nonsensical words or phrases may be inadvertentlytranscribed.   Although I have reviewed the note for such errors, some may still exist.

## 2022-09-15 NOTE — PATIENT INSTRUCTIONS
Make an appointment with GI doctor  Celi Woodard is a new sleep medicine we should be able to get you through the mail order pharmacy [facility] they will contact you about this. You take it 30 minutes before sleeping. Back off on the trazodone 100 mg at night.   Continue with your appointment with psychiatry

## 2022-09-27 RX ORDER — ARIPIPRAZOLE 5 MG/1
5 TABLET ORAL DAILY
Qty: 30 TABLET | Refills: 3 | Status: SHIPPED | OUTPATIENT
Start: 2022-09-27 | End: 2022-10-17

## 2022-10-14 ENCOUNTER — TELEPHONE (OUTPATIENT)
Dept: PSYCHIATRY | Age: 22
End: 2022-10-14

## 2022-10-14 NOTE — TELEPHONE ENCOUNTER
Called pt for appointment reminder.     -Pt confirmed      Electronically signed by Ashley Buckner MA on 10/14/2022 at 11:29 AM

## 2022-10-17 ENCOUNTER — OFFICE VISIT (OUTPATIENT)
Dept: PSYCHIATRY | Age: 22
End: 2022-10-17

## 2022-10-17 VITALS
WEIGHT: 187.4 LBS | HEIGHT: 73 IN | TEMPERATURE: 97.9 F | OXYGEN SATURATION: 97 % | DIASTOLIC BLOOD PRESSURE: 79 MMHG | SYSTOLIC BLOOD PRESSURE: 138 MMHG | BODY MASS INDEX: 24.84 KG/M2 | HEART RATE: 99 BPM

## 2022-10-17 DIAGNOSIS — F39 MOOD DISORDER (HCC): Primary | ICD-10-CM

## 2022-10-17 PROCEDURE — 1036F TOBACCO NON-USER: CPT | Performed by: NURSE PRACTITIONER

## 2022-10-17 PROCEDURE — G8420 CALC BMI NORM PARAMETERS: HCPCS | Performed by: NURSE PRACTITIONER

## 2022-10-17 PROCEDURE — G8428 CUR MEDS NOT DOCUMENT: HCPCS | Performed by: NURSE PRACTITIONER

## 2022-10-17 PROCEDURE — 99214 OFFICE O/P EST MOD 30 MIN: CPT | Performed by: NURSE PRACTITIONER

## 2022-10-17 PROCEDURE — G8484 FLU IMMUNIZE NO ADMIN: HCPCS | Performed by: NURSE PRACTITIONER

## 2022-10-17 RX ORDER — ARIPIPRAZOLE 15 MG/1
7.5 TABLET ORAL DAILY
Qty: 15 TABLET | Refills: 2 | Status: SHIPPED | OUTPATIENT
Start: 2022-10-17

## 2022-10-17 ASSESSMENT — PATIENT HEALTH QUESTIONNAIRE - PHQ9
7. TROUBLE CONCENTRATING ON THINGS, SUCH AS READING THE NEWSPAPER OR WATCHING TELEVISION: 1
SUM OF ALL RESPONSES TO PHQ QUESTIONS 1-9: 5
8. MOVING OR SPEAKING SO SLOWLY THAT OTHER PEOPLE COULD HAVE NOTICED. OR THE OPPOSITE, BEING SO FIGETY OR RESTLESS THAT YOU HAVE BEEN MOVING AROUND A LOT MORE THAN USUAL: 0
10. IF YOU CHECKED OFF ANY PROBLEMS, HOW DIFFICULT HAVE THESE PROBLEMS MADE IT FOR YOU TO DO YOUR WORK, TAKE CARE OF THINGS AT HOME, OR GET ALONG WITH OTHER PEOPLE: 0
3. TROUBLE FALLING OR STAYING ASLEEP: 2
1. LITTLE INTEREST OR PLEASURE IN DOING THINGS: 0
9. THOUGHTS THAT YOU WOULD BE BETTER OFF DEAD, OR OF HURTING YOURSELF: 0
6. FEELING BAD ABOUT YOURSELF - OR THAT YOU ARE A FAILURE OR HAVE LET YOURSELF OR YOUR FAMILY DOWN: 0
5. POOR APPETITE OR OVEREATING: 0
2. FEELING DOWN, DEPRESSED OR HOPELESS: 1
SUM OF ALL RESPONSES TO PHQ QUESTIONS 1-9: 5
4. FEELING TIRED OR HAVING LITTLE ENERGY: 1
SUM OF ALL RESPONSES TO PHQ QUESTIONS 1-9: 5
SUM OF ALL RESPONSES TO PHQ9 QUESTIONS 1 & 2: 1
SUM OF ALL RESPONSES TO PHQ QUESTIONS 1-9: 5
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
## 2022-10-19 ENCOUNTER — OFFICE VISIT (OUTPATIENT)
Dept: GASTROENTEROLOGY | Age: 22
End: 2022-10-19
Payer: MEDICAID

## 2022-10-19 VITALS
OXYGEN SATURATION: 98 % | DIASTOLIC BLOOD PRESSURE: 74 MMHG | WEIGHT: 189.6 LBS | HEART RATE: 110 BPM | HEIGHT: 73 IN | BODY MASS INDEX: 25.13 KG/M2 | SYSTOLIC BLOOD PRESSURE: 122 MMHG

## 2022-10-19 DIAGNOSIS — R06.6 CHRONIC HICCUPS: Primary | ICD-10-CM

## 2022-10-19 DIAGNOSIS — R12 HEARTBURN: ICD-10-CM

## 2022-10-19 DIAGNOSIS — R11.2 NAUSEA AND VOMITING, UNSPECIFIED VOMITING TYPE: ICD-10-CM

## 2022-10-19 PROCEDURE — 99204 OFFICE O/P NEW MOD 45 MIN: CPT | Performed by: NURSE PRACTITIONER

## 2022-10-19 PROCEDURE — 1036F TOBACCO NON-USER: CPT | Performed by: NURSE PRACTITIONER

## 2022-10-19 PROCEDURE — G8419 CALC BMI OUT NRM PARAM NOF/U: HCPCS | Performed by: NURSE PRACTITIONER

## 2022-10-19 PROCEDURE — G8484 FLU IMMUNIZE NO ADMIN: HCPCS | Performed by: NURSE PRACTITIONER

## 2022-10-19 PROCEDURE — G8427 DOCREV CUR MEDS BY ELIG CLIN: HCPCS | Performed by: NURSE PRACTITIONER

## 2022-10-19 RX ORDER — OMEPRAZOLE 40 MG/1
40 CAPSULE, DELAYED RELEASE ORAL 2 TIMES DAILY
Qty: 60 CAPSULE | Refills: 3 | Status: SHIPPED | OUTPATIENT
Start: 2022-10-19

## 2022-10-19 ASSESSMENT — ENCOUNTER SYMPTOMS
VOMITING: 1
CHOKING: 0
DIARRHEA: 0
SHORTNESS OF BREATH: 0
ABDOMINAL PAIN: 0
NAUSEA: 0
COUGH: 0
RECTAL PAIN: 0
ANAL BLEEDING: 0
CONSTIPATION: 0
TROUBLE SWALLOWING: 0
ABDOMINAL DISTENTION: 0
BLOOD IN STOOL: 0

## 2022-10-19 NOTE — PROGRESS NOTES
Subjective:     Patient ID: Alina Gonzalez is a 25 y.o. male  PCP: DAKOTAH Duckworth - CNP  Referring Provider: DAKOTAH Scott *    HPI  Patient presents to the office today with the following complaints: New Patient      Pt seen today for c/o chronic consistent hiccups for the last 3 years. He c/o heartburn and vomiting. Vomiting is daily. Emesis described as undigested food and acid. He is currently taking Omeprazole 40 mg daily. This controls the heartburn but hiccups are constant. He will have hiccups in his sleep as well. He is also taking Carafate 1 gm QID. He denies any issues swallowing         Last EGD 2015 - heartburn damaged esophagus per patient    Assessment:     1. Chronic hiccups    2. Heartburn    3. Nausea and vomiting, unspecified vomiting type            Plan:   - Increase Omeprazole 40 mg to BID   - Continue Carafate, instructed pt to make slurry  - Follow up in 6-8 weeks for procedure follow up  - Schedule EGD  Nothing to eat or drink after midnight. No driving for 24 hours after procedure. Bring a  to procedure. No aspirin, NSAIDs, fish oil 5 days before procedure. I have discussed the benefits, alternatives, and risks (including bleeding, perforation and death)  for pursuing Endoscopy (EGD/Colonscopy/EUS/ERCP) with the patient and they are willing to continue. We also discussed the need for anesthesia, IV access, proper dietary changes, medication changes if necessary, and need for bowel prep (if ordered) prior to their Endoscopic procedure. They are aware they must have someone accompany them to their scheduled procedure to drive them home - they agree to the above and are willing to continue. Orders  No orders of the defined types were placed in this encounter.     Medications  Orders Placed This Encounter   Medications    omeprazole (PRILOSEC) 40 MG delayed release capsule     Sig: Take 1 capsule by mouth in the morning and at bedtime     Dispense:  60 capsule     Refill:  3         Patient History:     Past Medical History:   Diagnosis Date    Anxiety     Chronic hiccups     Depression     GERD (gastroesophageal reflux disease)        Past Surgical History:   Procedure Laterality Date    ESOPHAGOGASTRODUODENOSCOPY  2015    heartburn damaged esoph per patient    TONSILLECTOMY         Family History   Problem Relation Age of Onset    High Blood Pressure Mother     Cancer Mother         cervical cancer    Hypertension Father     Bipolar Disorder Father     High Blood Pressure Father     Bipolar Disorder Maternal Grandfather     High Blood Pressure Maternal Grandfather     Cancer Paternal Grandmother         stomach cancer    Bipolar Disorder Paternal Grandfather     High Blood Pressure Paternal Grandfather     Cancer Paternal Grandfather         testicular    Colon Cancer Neg Hx     Esophageal Cancer Neg Hx     Liver Cancer Neg Hx     Rectal Cancer Neg Hx     Stomach Cancer Neg Hx        Social History     Socioeconomic History    Marital status:    Tobacco Use    Smoking status: Former     Years: 2.00     Types: Cigarettes     Quit date: 2019     Years since quitting: 3.8    Smokeless tobacco: Former     Types: Chew, Snuff    Tobacco comments:     Vape    Vaping Use    Vaping Use: Every day    Start date: 8/1/2016    Substances: Nicotine, Flavoring    Devices: Refillable tank   Substance and Sexual Activity    Alcohol use: Yes     Comment: special occasions    Drug use: Yes     Types: Marijuana Johan Spina)     Comment: occasional    Sexual activity: Yes     Partners: Female     Social Determinants of Health     Financial Resource Strain: Medium Risk    Difficulty of Paying Living Expenses: Somewhat hard   Food Insecurity: Food Insecurity Present    Worried About Running Out of Food in the Last Year: Sometimes true    Ran Out of Food in the Last Year: Sometimes true       Current Outpatient Medications   Medication Sig Dispense Refill    omeprazole (PRILOSEC) 40 MG delayed release capsule Take 1 capsule by mouth in the morning and at bedtime 60 capsule 3    ARIPiprazole (ABILIFY) 15 MG tablet Take 0.5 tablets by mouth daily 15 tablet 2    Daridorexant HCl (QUVIVIQ) 50 MG TABS 1 p.o. 30 minutes before bedtime. 30 tablet 2    traZODone (DESYREL) 50 MG tablet 1-2 PO at HS for sleep and depression 60 tablet 5    sucralfate (CARAFATE) 1 GM tablet Take 1 tablet by mouth 4 times daily 120 tablet 3     No current facility-administered medications for this visit. No Known Allergies    Review of Systems   Constitutional:  Negative for activity change, appetite change, fatigue, fever and unexpected weight change. HENT:  Negative for trouble swallowing. Respiratory:  Negative for cough, choking and shortness of breath. Cardiovascular:  Negative for chest pain. Gastrointestinal:  Positive for vomiting. Negative for abdominal distention, abdominal pain, anal bleeding, blood in stool, constipation, diarrhea, nausea and rectal pain. Heartburn, hiccups   Allergic/Immunologic: Negative for food allergies. All other systems reviewed and are negative. Objective:     /74   Pulse (!) 110   Ht 6' 1\" (1.854 m)   Wt 189 lb 9.6 oz (86 kg)   SpO2 98%   BMI 25.01 kg/m²     Physical Exam  Vitals reviewed. Constitutional:       General: He is not in acute distress. Appearance: He is well-developed. HENT:      Head: Normocephalic and atraumatic. Right Ear: External ear normal.      Left Ear: External ear normal.      Nose: Nose normal.   Eyes:      General: No scleral icterus. Right eye: No discharge. Left eye: No discharge. Conjunctiva/sclera: Conjunctivae normal.      Pupils: Pupils are equal, round, and reactive to light. Cardiovascular:      Rate and Rhythm: Normal rate and regular rhythm. Heart sounds: Normal heart sounds. No murmur heard. No friction rub. No gallop.    Pulmonary:      Effort: Pulmonary effort is normal. No respiratory distress. Breath sounds: Normal breath sounds. No wheezing or rales. Abdominal:      General: Bowel sounds are normal. There is no distension. Palpations: Abdomen is soft. There is no mass. Tenderness: no abdominal tenderness There is no guarding or rebound. Musculoskeletal:         General: Normal range of motion. Cervical back: Normal range of motion and neck supple. Skin:     General: Skin is warm and dry. Coloration: Skin is not pale. Findings: No rash. Nails: There is no clubbing. Neurological:      Mental Status: He is alert and oriented to person, place, and time. Gait: Gait normal.   Psychiatric:         Behavior: Behavior normal.         Thought Content:  Thought content normal.

## 2022-10-21 ENCOUNTER — HOSPITAL ENCOUNTER (OUTPATIENT)
Age: 22
Setting detail: OUTPATIENT SURGERY
Discharge: HOME | End: 2022-10-21
Attending: INTERNAL MEDICINE | Admitting: INTERNAL MEDICINE
Payer: MEDICAID

## 2022-10-21 ENCOUNTER — ANESTHESIA (OUTPATIENT)
Dept: ENDOSCOPY | Age: 22
End: 2022-10-21
Payer: MEDICAID

## 2022-10-21 ENCOUNTER — TELEPHONE (OUTPATIENT)
Dept: GASTROENTEROLOGY | Age: 22
End: 2022-10-21

## 2022-10-21 ENCOUNTER — ANESTHESIA EVENT (OUTPATIENT)
Dept: ENDOSCOPY | Age: 22
End: 2022-10-21
Payer: MEDICAID

## 2022-10-21 VITALS
TEMPERATURE: 97.7 F | SYSTOLIC BLOOD PRESSURE: 104 MMHG | WEIGHT: 190 LBS | OXYGEN SATURATION: 100 % | BODY MASS INDEX: 25.73 KG/M2 | HEIGHT: 72 IN | RESPIRATION RATE: 16 BRPM | DIASTOLIC BLOOD PRESSURE: 75 MMHG | HEART RATE: 67 BPM

## 2022-10-21 DIAGNOSIS — R11.11 VOMITING WITHOUT NAUSEA, UNSPECIFIED VOMITING TYPE: ICD-10-CM

## 2022-10-21 DIAGNOSIS — F41.8 DEPRESSION WITH ANXIETY: ICD-10-CM

## 2022-10-21 DIAGNOSIS — F39 MOOD DISORDER (HCC): ICD-10-CM

## 2022-10-21 DIAGNOSIS — R06.6 CHRONIC HICCUPS: Primary | ICD-10-CM

## 2022-10-21 DIAGNOSIS — R11.2 NAUSEA AND VOMITING, UNSPECIFIED VOMITING TYPE: ICD-10-CM

## 2022-10-21 DIAGNOSIS — R06.6 CHRONIC HICCUPS: ICD-10-CM

## 2022-10-21 PROCEDURE — 6360000002 HC RX W HCPCS

## 2022-10-21 PROCEDURE — 43239 EGD BIOPSY SINGLE/MULTIPLE: CPT | Performed by: INTERNAL MEDICINE

## 2022-10-21 PROCEDURE — 2500000003 HC RX 250 WO HCPCS

## 2022-10-21 PROCEDURE — 7100000010 HC PHASE II RECOVERY - FIRST 15 MIN: Performed by: INTERNAL MEDICINE

## 2022-10-21 PROCEDURE — 3700000000 HC ANESTHESIA ATTENDED CARE: Performed by: INTERNAL MEDICINE

## 2022-10-21 PROCEDURE — 88305 TISSUE EXAM BY PATHOLOGIST: CPT

## 2022-10-21 PROCEDURE — 3700000001 HC ADD 15 MINUTES (ANESTHESIA): Performed by: INTERNAL MEDICINE

## 2022-10-21 PROCEDURE — 88342 IMHCHEM/IMCYTCHM 1ST ANTB: CPT

## 2022-10-21 PROCEDURE — 3609012400 HC EGD TRANSORAL BIOPSY SINGLE/MULTIPLE: Performed by: INTERNAL MEDICINE

## 2022-10-21 PROCEDURE — 2709999900 HC NON-CHARGEABLE SUPPLY: Performed by: INTERNAL MEDICINE

## 2022-10-21 PROCEDURE — 7100000011 HC PHASE II RECOVERY - ADDTL 15 MIN: Performed by: INTERNAL MEDICINE

## 2022-10-21 PROCEDURE — 2580000003 HC RX 258: Performed by: INTERNAL MEDICINE

## 2022-10-21 RX ORDER — PROPOFOL 10 MG/ML
INJECTION, EMULSION INTRAVENOUS PRN
Status: DISCONTINUED | OUTPATIENT
Start: 2022-10-21 | End: 2022-10-21 | Stop reason: SDUPTHER

## 2022-10-21 RX ORDER — FENTANYL CITRATE 50 UG/ML
INJECTION, SOLUTION INTRAMUSCULAR; INTRAVENOUS PRN
Status: DISCONTINUED | OUTPATIENT
Start: 2022-10-21 | End: 2022-10-21 | Stop reason: SDUPTHER

## 2022-10-21 RX ORDER — MIDAZOLAM HYDROCHLORIDE 1 MG/ML
INJECTION INTRAMUSCULAR; INTRAVENOUS PRN
Status: DISCONTINUED | OUTPATIENT
Start: 2022-10-21 | End: 2022-10-21 | Stop reason: SDUPTHER

## 2022-10-21 RX ORDER — SODIUM CHLORIDE, SODIUM LACTATE, POTASSIUM CHLORIDE, CALCIUM CHLORIDE 600; 310; 30; 20 MG/100ML; MG/100ML; MG/100ML; MG/100ML
INJECTION, SOLUTION INTRAVENOUS CONTINUOUS
Status: DISCONTINUED | OUTPATIENT
Start: 2022-10-21 | End: 2022-10-21 | Stop reason: HOSPADM

## 2022-10-21 RX ORDER — LIDOCAINE HYDROCHLORIDE 10 MG/ML
INJECTION, SOLUTION EPIDURAL; INFILTRATION; INTRACAUDAL; PERINEURAL PRN
Status: DISCONTINUED | OUTPATIENT
Start: 2022-10-21 | End: 2022-10-21 | Stop reason: SDUPTHER

## 2022-10-21 RX ADMIN — MIDAZOLAM 2 MG: 1 INJECTION INTRAMUSCULAR; INTRAVENOUS at 08:18

## 2022-10-21 RX ADMIN — PROPOFOL 170 MG: 10 INJECTION, EMULSION INTRAVENOUS at 08:22

## 2022-10-21 RX ADMIN — FENTANYL CITRATE 100 MCG: 50 INJECTION INTRAMUSCULAR; INTRAVENOUS at 08:19

## 2022-10-21 RX ADMIN — SODIUM CHLORIDE, POTASSIUM CHLORIDE, SODIUM LACTATE AND CALCIUM CHLORIDE: 600; 310; 30; 20 INJECTION, SOLUTION INTRAVENOUS at 07:49

## 2022-10-21 RX ADMIN — LIDOCAINE HYDROCHLORIDE 100 MG: 10 INJECTION, SOLUTION EPIDURAL; INFILTRATION; INTRACAUDAL; PERINEURAL at 08:22

## 2022-10-21 ASSESSMENT — PAIN SCALES - GENERAL
PAINLEVEL_OUTOF10: 0

## 2022-10-21 ASSESSMENT — ENCOUNTER SYMPTOMS: SHORTNESS OF BREATH: 0

## 2022-10-21 ASSESSMENT — PAIN - FUNCTIONAL ASSESSMENT: PAIN_FUNCTIONAL_ASSESSMENT: 0-10

## 2022-10-21 NOTE — TELEPHONE ENCOUNTER
10-21-22 per Dr Xiao Crandall note         While uncontrolled GERD can sometimes be a source of chronic hiccups, other etiology including neurological and psychosomatic should be in the differential diagnosis in this patient.    RECOMMENDATIONS:    4.  Consider neurology consult for further evaluation        Called the patient he did want to proceed with the Neurology consult      Order put in Epic

## 2022-10-21 NOTE — DISCHARGE INSTRUCTIONS
1. Await path results, the patient will be contacted in 7-10 days with biopsy results. 2.  Discontinue tobacco abuse as soon as possible  3. Continue PPI and anti-GERD measures    4. Consider neurology consult for further evaluation  5.  - Resume previous meds and diet  - GI clinic f/u 4-6 weeks with Ms. Elvira Palacios scheduled f/u appts with other MDs     - NO ASA/NSAIDs x 2 weeks     Upper GI Endoscopy: What to Expect at [address] had an upper GI endoscopy. Your doctor used a thin, lighted tube that bends to look at the inside of your esophagus, your stomach, and the first part of the small intestine, called the duodenum. After you have an endoscopy, you will stay at the hospital or clinic for 1 to 2 hours. This will allow the medicine to wear off. You will be able to go home after your doctor or nurse checks to make sure that you're not having any problems. You may have to stay overnight if you had treatment during the test. You may have a sore throat for a day or two after the test.  This care sheet gives you a general idea about what to expect after the test.  How can you care for yourself at home? Activity   Rest as much as you need to after you go home. You should be able to go back to your usual activities the day after the test.  Diet   Follow your doctor's directions for eating after the test.  Drink plenty of fluids (unless your doctor has told you not to). Medications   If you have a sore throat the day after the test, use an over-the-counter spray to numb your throat. Follow-up care is a key part of your treatment and safety. Be sure to make and go to all appointments, and call your doctor if you are having problems. It's also a good idea to know your test results and keep a list of the medicines you take. When should you call for help? Call 911 anytime you think you may need emergency care. For example, call if:    You passed out (lost consciousness).      You have trouble breathing. You pass maroon or bloody stools. Call your doctor now or seek immediate medical care if:    You have pain that does not get better after your take pain medicine. You have new or worse belly pain. You have blood in your stools. You are sick to your stomach and cannot keep fluids down. You have a fever. You cannot pass stools or gas. Watch closely for changes in your health, and be sure to contact your doctor if:    Your throat still hurts after a day or two. You do not get better as expected. Where can you learn more? Go to https://[employer]. org and sign in to your [employer] account. Enter P084 in the Zapproved box to learn more about \"Upper GI Endoscopy: What to Expect at Home. \"     If you do not have an account, please click on the \"Sign Up Now\" link. Current as of: June 6, 2022               Content Version: 13.4  © 2006-2022 [employer]wise, Incorporated. Care instructions adapted under license by [facility] ([facility]). If you have questions about a medical condition or this instruction, always ask your healthcare professional. [place] [address] any warranty or liability for your use of this information.

## 2022-10-21 NOTE — ANESTHESIA PRE PROCEDURE
Department of Anesthesiology  Preprocedure Note       Name:  Alina Gonzalez   Age:  25 y.o.  :  2000                                          MRN:  [id number]         Date:  10/21/2022      Surgeon: Panchito Reid):  Iftikhar Edwards MD    Procedure: Procedure(s):  EGD BIOPSY    Medications prior to admission:   Prior to Admission medications    Medication Sig Start Date End Date Taking? Authorizing Provider   omeprazole (PRILOSEC) 40 MG delayed release capsule Take 1 capsule by mouth in the morning and at bedtime 10/19/22   DAKOTAH Hargrove - NP   ARIPiprazole (ABILIFY) 15 MG tablet Take 0.5 tablets by mouth daily 10/17/22   DAKOTAH Rasheed - MARISOL   Daridorexant HCl (QUVIVIQ) 50 MG TABS 1 p.o. 30 minutes before bedtime.  9/15/22   DAKOTAH Duckworth CNP   traZODone (DESYREL) 50 MG tablet 1-2 PO at HS for sleep and depression 9/15/22   DAKOTAH Duckworth CNP   sucralfate (CARAFATE) 1 GM tablet Take 1 tablet by mouth 4 times daily 22   DAKOTAH Duckworth CNP       Current medications:    Current Facility-Administered Medications   Medication Dose Route Frequency Provider Last Rate Last Admin    lactated ringers infusion   IntraVENous Continuous Iftikhar Edwards MD           Allergies:  No Known Allergies    Problem List:    Patient Active Problem List   Diagnosis Code    Depression with anxiety F41.8    Chronic hiccups R06.6    Mood disorder ([facility] [address] F39       Past Medical History:        Diagnosis Date    Anxiety     Chronic hiccups     Depression     GERD (gastroesophageal reflux disease)        Past Surgical History:        Procedure Laterality Date    ESOPHAGOGASTRODUODENOSCOPY      heartburn damaged esoph per patient    TONSILLECTOMY         Social History:    Social History     Tobacco Use    Smoking status: Former     Years: 2.00     Types: Cigarettes     Quit date: 2019     Years since quitting: 3.8    Smokeless tobacco: Former     Types: Chew, Snuff    Tobacco comments:     Vape    Substance Use Topics    Alcohol use: Yes     Comment: special occasions                                Counseling given: Not Answered  Tobacco comments: Vape       Vital Signs (Current): There were no vitals filed for this visit. BP Readings from Last 3 Encounters:   10/19/22 122/74   10/17/22 138/79   09/15/22 120/70       NPO Status:                                                                                 BMI:   Wt Readings from Last 3 Encounters:   10/19/22 189 lb 9.6 oz (86 kg)   10/17/22 187 lb 6.4 oz (85 kg)   09/15/22 186 lb (84.4 kg)     There is no height or weight on file to calculate BMI.    CBC:   Lab Results   Component Value Date/Time    WBC 4.9 03/03/2021 01:45 PM    RBC 5.32 03/03/2021 01:45 PM    HGB 15.2 03/03/2021 01:45 PM    HCT 46.4 03/03/2021 01:45 PM    MCV 87.2 03/03/2021 01:45 PM    RDW 13.0 03/03/2021 01:45 PM     03/03/2021 01:45 PM       CMP:   Lab Results   Component Value Date/Time     03/03/2021 01:45 PM    K 4.3 03/03/2021 01:45 PM     03/03/2021 01:45 PM    CO2 27 03/03/2021 01:45 PM    BUN 11 03/03/2021 01:45 PM    CREATININE 0.9 03/03/2021 01:45 PM    GFRAA >59 03/03/2021 01:45 PM    LABGLOM >60 03/03/2021 01:45 PM    GLUCOSE 78 03/03/2021 01:45 PM    PROT 6.7 03/03/2021 01:45 PM    CALCIUM 9.6 03/03/2021 01:45 PM    BILITOT 1.2 03/03/2021 01:45 PM    ALKPHOS 89 03/03/2021 01:45 PM    AST 14 03/03/2021 01:45 PM    ALT 27 03/03/2021 01:45 PM       POC Tests: No results for input(s): POCGLU, POCNA, POCK, POCCL, POCBUN, POCHEMO, POCHCT in the last 72 hours. Coags: No results found for: PROTIME, INR, APTT    HCG (If Applicable): No results found for: PREGTESTUR, PREGSERUM, HCG, HCGQUANT     ABGs: No results found for: PHART, PO2ART, FEQ9DGV, FED2LGM, BEART, U6DXYUJN     Type & Screen (If Applicable):  No results found for: LABABO, LABRH    Drug/Infectious Status (If Applicable):   No results found for: HIV, HEPCAB    COVID-19 Screening (If Applicable): No results found for: COVID19        Anesthesia Evaluation  Patient summary reviewed and Nursing notes reviewed no history of anesthetic complications:   Airway: Mallampati: I  TM distance: >3 FB   Neck ROM: full  Mouth opening: > = 3 FB   Dental: normal exam         Pulmonary:Negative Pulmonary ROS and normal exam        (-) COPD, asthma, shortness of breath and sleep apnea                           Cardiovascular:             Beta Blocker:  Not on Beta Blocker         Neuro/Psych:   (+) psychiatric history:depression/anxiety             GI/Hepatic/Renal:   (+) GERD:,      (-) no renal disease       Endo/Other: Negative Endo/Other ROS       (-) diabetes mellitus, hypothyroidism, hyperthyroidism, blood dyscrasia               Abdominal:             Vascular: Other Findings:           Anesthesia Plan      MAC and TIVA     ASA 1             Anesthetic plan and risks discussed with patient.                         DAKOTAH Aragon - CRNA   10/21/2022

## 2022-10-21 NOTE — OP NOTE
Endoscopic Procedure Note    Patient: Thelbert Holstein: 2000  [facility] Rec#: [id number] Acc#: [id number]     Primary Care Provider DAKOTAH Peterson - CNP    Endoscopist: Howie Norris MD, MD    Date of Procedure:  10/21/2022    Procedure:   1. EGD with cold biopsies    Indications:      1. Chronic hiccups    2. Heartburn    3. Nausea and vomiting, unspecified vomiting type    Last EGD 2015 - heartburn damaged esophagus per patient    Anesthesia:  Sedation was administered by anesthesia who monitored the patient during the procedure. Estimated Blood Loss: minimal    Procedure:   After reviewing the patient's chart and obtaining informed consent, the patient was placed in the left lateral decubitus position. A forward-viewing Olympus endoscope was lubricated and inserted through the mouth into the oropharynx. Under direct visualization, the upper esophagus was intubated. The scope was advanced to the level of the third portion of duodenum. Scope was slowly withdrawn with careful inspection of the mucosal surfaces. The scope was retroflexed for inspection of the gastric fundus and incisura. Findings and maneuvers are listed in impression below. The patient tolerated the procedure well. The scope was removed. There were no immediate complications. Findings/IMPRESSION:  Esophagus: abnormal: Normal upper two thirds; in the distal esophagus for small linear erosions near of the EG junction at 40 cm were noted consistent with GERD. Random cold biopsies were taken from the esophagus to check for eosinophilic esophagitis or other pathology besides GERD. NO ulcers or nodules or strictures or webs or rings or mass lesions or extrinsic compression or diverticula noted. There is no obvious hiatal hernia present.       Stomach:  abnormal: Mild patchy mucosal changes with linear erythema and few small 1 to 2 mm erosions in the distal antrum suggestive of mild gastritis noted -  Gastric biopsies were taken from the antrum and body to rule out Helicobacter pylori infection. NO ulcers or masses or gastric outlet obstruction or retained food or fluid. Rugae were normal and lumen distended well with insufflation. Retroflexed views otherwise revealed a normal GE junction, fundus and cardia as well. Duodenum: normal including the major duodenal papilla    While uncontrolled GERD can sometimes be a source of chronic hiccups, other etiology including neurological and psychosomatic should be in the differential diagnosis in this patient. RECOMMENDATIONS:    1. Await path results, the patient will be contacted in 7-10 days with biopsy results. 2.  Discontinue tobacco abuse as soon as possible  3. Continue PPI and anti-GERD measures    4. Consider neurology consult for further evaluation  5.  - Resume previous meds and diet  - GI clinic f/u 4-6 weeks with Ms. Justo Collins scheduled f/u appts with other MDs     - NO ASA/NSAIDs x 2 weeks     The results were discussed with the patient and family. A copy of the images obtained were given to the patient.      (Please note that portions of this note were completed with a voice recognition program. Efforts were made to edit the dictations but occasionally words may be mis-transcribed.)     Vikash Espinoza MD, MD  10/21/2022  8:23 AM

## 2022-10-21 NOTE — ANESTHESIA POSTPROCEDURE EVALUATION
Department of Anesthesiology  Postprocedure Note    Patient: Chano Garcia  MRN: [id number]  YOB: 2000  Date of evaluation: 10/21/2022      Procedure Summary     Date: 10/21/22 Room / Location: [address]    Anesthesia Start: 5434 Anesthesia Stop: 4985    Procedure: EGD BIOPSY (Abdomen) Diagnosis:       Chronic hiccups      Vomiting without nausea, unspecified vomiting type      (Chronic hiccups [R06.6])      (Vomiting without nausea, unspecified vomiting type [R11.11])    Surgeons: Alsesia Brown MD Responsible Provider: DAKOTAH Avelar CRNA    Anesthesia Type: MAC, TIVA ASA Status: 1          Anesthesia Type: No value filed.     Stacey Phase I:      Stacey Phase II:        Anesthesia Post Evaluation    Patient location during evaluation: bedside  Patient participation: waiting for patient participation  Level of consciousness: responsive to physical stimuli  Pain score: 0  Airway patency: patent  Nausea & Vomiting: no nausea and no vomiting  Complications: no  Cardiovascular status: hemodynamically stable  Respiratory status: spontaneous ventilation, room air and acceptable  Hydration status: stable

## 2022-10-21 NOTE — H&P
Patient Name: Luz Arroyo  : 2000  MRN: [id number]  DATE: 10/21/22    Allergies: No Known Allergies     ENDOSCOPY  History and Physical    Procedure:    [] Diagnostic Colonoscopy       [] Screening Colonoscopy  [x] EGD      [] ERCP      [] EUS       [] Other    [x] Previous office notes/History and Physical reviewed from the patients chart. Please see EMR for further details of HPI. I have examined the patient's status immediately prior to the procedure and:      Indications/HPI:    1. Chronic hiccups    2. Heartburn    3. Nausea and vomiting, unspecified vomiting type    Last EGD  - heartburn damaged esophagus per patient    []Abdominal Pain   []Cancer- GI/Lung     []Fhx of colon CA/polyps  []History of Polyps  []Barretts            []Melena  []Abnormal Imaging              []Dysphagia              []Persistent Pneumonia   []Anemia                            []Food Impaction        []History of Polyps  [] GI Bleed             []Pulmonary nodule/Mass   []Change in bowel habits []Heartburn/Reflux  []Rectal Bleed (BRBPR)  []Chest Pain - Non Cardiac []Heme (+) Stool []Ulcers  []Constipation  []Hemoptysis  []Varices  []Diarrhea  []Hypoxemia    []Nausea/Vomiting   []Screening   []Crohns/Colitis  []Other:     Anesthesia:   [x] MAC [] Moderate Sedation   [] General   [] None     ROS: 12 pt Review of Symptoms was negative unless mentioned above    Medications:   Prior to Admission medications    Medication Sig Start Date End Date Taking? Authorizing Provider   omeprazole (PRILOSEC) 40 MG delayed release capsule Take 1 capsule by mouth in the morning and at bedtime 10/19/22   DAKOTAH Aquino - NP   ARIPiprazole (ABILIFY) 15 MG tablet Take 0.5 tablets by mouth daily 10/17/22   DAKOTAH Hayes CNP   Daridorexant HCl (QUVIVIQ) 50 MG TABS 1 p.o. 30 minutes before bedtime.  9/15/22   DAKOTAH Holden CNP   traZODone (DESYREL) 50 MG tablet 1-2 PO at HS for sleep and depression 9/15/22   Chelsie Sary Roque APRN - CNP   sucralfate (CARAFATE) 1 GM tablet Take 1 tablet by mouth 4 times daily 6/17/22   Irma Nayak APRN - CNP       Past Medical History:  Past Medical History:   Diagnosis Date    Anxiety     Chronic hiccups     Depression     GERD (gastroesophageal reflux disease)        Past Surgical History:  Past Surgical History:   Procedure Laterality Date    ESOPHAGOGASTRODUODENOSCOPY  2015    heartburn damaged esoph per patient    TONSILLECTOMY         Social History:  Social History     Tobacco Use    Smoking status: Former     Years: 2.00     Types: Cigarettes     Quit date: 2019     Years since quitting: 3.8    Smokeless tobacco: Former     Types: Chew, Snuff    Tobacco comments:     Vape    Vaping Use    Vaping Use: Every day    Start date: 8/1/2016    Substances: Nicotine, Flavoring    Devices: [employer]ble tank   Substance Use Topics    Alcohol use: Yes     Comment: special occasions    Drug use: Yes     Types: Marijuana (Weed)     Comment: occasional       Vital Signs:   Vitals:    10/21/22 0738   BP: (!) 113/92   Pulse: 69   Resp: 16   Temp: 98.4 °F (36.9 °C)   SpO2: 99%        Physical Exam:  Cardiac:  [x]WNL  []Comments:  Pulmonary:  [x]WNL   []Comments:  Neuro/Mental Status:  [x]WNL  []Comments:  Abdominal:  [x]WNL    []Comments:  Other:   []WNL  []Comments:    Informed Consent:  The risks and benefits of the procedure have been discussed with either the patient or if they cannot consent, their representative. Assessment:  Patient examined and appropriate for planned sedation and procedure. Plan:  Proceed with planned sedation and procedure as above.          Krish Brunner MD

## 2022-10-24 RX ORDER — TRAZODONE HYDROCHLORIDE 50 MG/1
TABLET ORAL
Qty: 60 TABLET | Refills: 5 | Status: SHIPPED | OUTPATIENT
Start: 2022-10-24

## 2022-11-14 ENCOUNTER — OFFICE VISIT (OUTPATIENT)
Dept: GASTROENTEROLOGY | Age: 22
End: 2022-11-14
Payer: MEDICAID

## 2022-11-14 VITALS
WEIGHT: 188 LBS | DIASTOLIC BLOOD PRESSURE: 70 MMHG | HEART RATE: 97 BPM | BODY MASS INDEX: 25.5 KG/M2 | SYSTOLIC BLOOD PRESSURE: 130 MMHG | OXYGEN SATURATION: 97 %

## 2022-11-14 DIAGNOSIS — R06.6 CHRONIC HICCUPS: ICD-10-CM

## 2022-11-14 DIAGNOSIS — K21.00 GASTROESOPHAGEAL REFLUX DISEASE WITH ESOPHAGITIS WITHOUT HEMORRHAGE: Primary | ICD-10-CM

## 2022-11-14 PROCEDURE — G8427 DOCREV CUR MEDS BY ELIG CLIN: HCPCS | Performed by: NURSE PRACTITIONER

## 2022-11-14 PROCEDURE — 1036F TOBACCO NON-USER: CPT | Performed by: NURSE PRACTITIONER

## 2022-11-14 PROCEDURE — G8419 CALC BMI OUT NRM PARAM NOF/U: HCPCS | Performed by: NURSE PRACTITIONER

## 2022-11-14 PROCEDURE — G8484 FLU IMMUNIZE NO ADMIN: HCPCS | Performed by: NURSE PRACTITIONER

## 2022-11-14 PROCEDURE — 99213 OFFICE O/P EST LOW 20 MIN: CPT | Performed by: NURSE PRACTITIONER

## 2022-11-14 RX ORDER — OMEPRAZOLE 40 MG/1
40 CAPSULE, DELAYED RELEASE ORAL DAILY
Qty: 90 CAPSULE | Refills: 3 | Status: SHIPPED | OUTPATIENT
Start: 2022-11-14

## 2022-11-14 RX ORDER — SUCRALFATE 1 G/1
1 TABLET ORAL 4 TIMES DAILY PRN
Qty: 120 TABLET | Refills: 3 | Status: SHIPPED
Start: 2022-11-14

## 2022-11-14 ASSESSMENT — ENCOUNTER SYMPTOMS
ANAL BLEEDING: 0
NAUSEA: 0
SHORTNESS OF BREATH: 0
DIARRHEA: 0
ABDOMINAL DISTENTION: 0
RECTAL PAIN: 0
CHOKING: 0
TROUBLE SWALLOWING: 0
BLOOD IN STOOL: 0
VOMITING: 0
COUGH: 0
ABDOMINAL PAIN: 0
CONSTIPATION: 0

## 2022-11-14 NOTE — PROGRESS NOTES
Subjective:     Patient ID: Chano Garcia is a 25 y.o. male  PCP: Santosh Cherry, DAKOTAH - CNP  Referring Provider: No ref. provider found    HPI  Patient presents to the office today with the following complaints: Follow-up      Pt seen today for follow up after EGD on 10/21/22 for c/o hiccups and vomiting. He is currently taking Omeprazole 40 mg BID and Carafate 1-2 times a day. He denies any improvement in hiccups with treatment. He continues to have constant hiccups. He denies any further issues or concerns today. He has upcoming appt with Neurology on 12/6/22. EGD Findings/IMPRESSION 10/21/22:  Esophagus: abnormal: Normal upper two thirds; in the distal esophagus for small linear erosions near of the EG junction at 40 cm were noted consistent with GERD. Random cold biopsies were taken from the esophagus to check for eosinophilic esophagitis or other pathology besides GERD. NO ulcers or nodules or strictures or webs or rings or mass lesions or extrinsic compression or diverticula noted. There is no obvious hiatal hernia present. Stomach:  abnormal: Mild patchy mucosal changes with linear erythema and few small 1 to 2 mm erosions in the distal antrum suggestive of mild gastritis noted -  Gastric biopsies were taken from the antrum and body to rule out Helicobacter pylori infection. NO ulcers or masses or gastric outlet obstruction or retained food or fluid. Rugae were normal and lumen distended well with insufflation. Retroflexed views otherwise revealed a normal GE junction, fundus and cardia as well. Duodenum: normal including the major duodenal papilla  While uncontrolled GERD can sometimes be a source of chronic hiccups, other etiology including neurological and psychosomatic should be in the differential diagnosis in this patient. RECOMMENDATIONS:    1. Await path results, the patient will be contacted in 7-10 days with biopsy results.    2.  Discontinue tobacco abuse as soon as possible  3. Continue PPI and anti-GERD measures  4. Consider neurology consult for further evaluation  5.  - Resume previous meds and diet  - GI clinic f/u 4-6 weeks with Ms. Rhesa Libman scheduled f/u appts with other MDs   - NO ASA/NSAIDs x 2 weeks     FINAL DIAGNOSIS:   A. Stomach, random gastric biopsies:   1. Benign gastric mucosa with minimal chronic inflammation. 2.  No Helicobacter pylori organisms identified by immunohistochemical   stain. B.  Esophagus, random esophageal biopsies:   Benign esophageal mucosa with focal changes consistent with reflux esophagitis, negative for evidence of eosinophilic esophagitis. Assessment:     1. Gastroesophageal reflux disease with esophagitis without hemorrhage    2. Chronic hiccups            Plan:   - Ok to decrease Omeprazole 40 mg daily  - Follow up yearly for medication refills  - Keep appt with Neurology  - Call with any questions or concerns         Orders  No orders of the defined types were placed in this encounter.     Medications  Orders Placed This Encounter   Medications    omeprazole (PRILOSEC) 40 MG delayed release capsule     Sig: Take 1 capsule by mouth daily     Dispense:  90 capsule     Refill:  3    sucralfate (CARAFATE) 1 GM tablet     Sig: Take 1 tablet by mouth 4 times daily as needed     Dispense:  120 tablet     Refill:  3         Patient History:     Past Medical History:   Diagnosis Date    Anxiety     Chronic hiccups     Depression     GERD (gastroesophageal reflux disease)        Past Surgical History:   Procedure Laterality Date    ESOPHAGOGASTRODUODENOSCOPY  2015    heartburn damaged esoph per patient    TONSILLECTOMY      UPPER GASTROINTESTINAL ENDOSCOPY N/A 10/21/2022    Dr Soy Soliz,  1-2 mm erosions in antrum-sugg of mild gastritis, GERD, no h pylori       Family History   Problem Relation Age of Onset    High Blood Pressure Mother     Cancer Mother         cervical cancer    Hypertension Father     Bipolar Disorder Father     High Blood Pressure Father     Bipolar Disorder Maternal Grandfather     High Blood Pressure Maternal Grandfather     Cancer Paternal Grandmother         stomach cancer    Bipolar Disorder Paternal Grandfather     High Blood Pressure Paternal Grandfather     Cancer Paternal Grandfather         testicular    Colon Cancer Neg Hx     Esophageal Cancer Neg Hx     Liver Cancer Neg Hx     Rectal Cancer Neg Hx     Stomach Cancer Neg Hx        Social History     Socioeconomic History    Marital status:    Tobacco Use    Smoking status: Former     Years: 2.00     Types: Cigarettes     Quit date: 2019     Years since quitting: 3.8    Smokeless tobacco: Former     Types: Chew, Snuff    Tobacco comments:     Vape    Vaping Use    Vaping Use: Every day    Start date: 8/1/2016    Substances: Nicotine, Flavoring    Devices: Refillable tank   Substance and Sexual Activity    Alcohol use: Yes     Comment: special occasions    Drug use: Yes     Types: Marijuana (Weed)     Comment: occasional    Sexual activity: Yes     Partners: Female     Social Determinants of Health     Financial Resource Strain: Medium Risk    Difficulty of Paying Living Expenses: Somewhat hard   Food Insecurity: Food Insecurity Present    Worried About Running Out of Food in the Last Year: Sometimes true    Ran Out of Food in the Last Year: Sometimes true       Current Outpatient Medications   Medication Sig Dispense Refill    omeprazole (PRILOSEC) 40 MG delayed release capsule Take 1 capsule by mouth daily 90 capsule 3    sucralfate (CARAFATE) 1 GM tablet Take 1 tablet by mouth 4 times daily as needed 120 tablet 3    traZODone (DESYREL) 50 MG tablet 1-2 PO at HS for sleep and depression 60 tablet 5    ARIPiprazole (ABILIFY) 15 MG tablet Take 0.5 tablets by mouth daily 15 tablet 2    Daridorexant HCl (QUVIVIQ) 50 MG TABS 1 p.o. 30 minutes before bedtime. 30 tablet 2     No current facility-administered medications for this visit. No Known Allergies    Review of Systems   Constitutional:  Negative for activity change, appetite change, fatigue, fever and unexpected weight change. HENT:  Negative for trouble swallowing. Respiratory:  Negative for cough, choking and shortness of breath. Cardiovascular:  Negative for chest pain. Gastrointestinal:  Negative for abdominal distention, abdominal pain, anal bleeding, blood in stool, constipation, diarrhea, nausea, rectal pain and vomiting. Reflux, hiccups    Allergic/Immunologic: Negative for food allergies. All other systems reviewed and are negative. Objective:     /70 (Site: Left Upper Arm)   Pulse 97   Wt 188 lb (85.3 kg)   SpO2 97%   BMI 25.50 kg/m²     Physical Exam  Vitals reviewed. Constitutional:       General: He is not in acute distress. Appearance: He is well-developed. Eyes:      General:         Right eye: No discharge. Left eye: No discharge. Cardiovascular:      Rate and Rhythm: Normal rate and regular rhythm. Pulmonary:      Effort: Pulmonary effort is normal. No respiratory distress. Abdominal:      General: There is no distension. Palpations: Abdomen is soft. There is no mass. Musculoskeletal:         General: Normal range of motion. Cervical back: Normal range of motion. Skin:     General: Skin is warm and dry. Neurological:      Mental Status: He is alert and oriented to person, place, and time.    Psychiatric:         Behavior: Behavior normal.

## 2022-11-14 NOTE — PATIENT INSTRUCTIONS
Patient Education        Gastroesophageal Reflux Disease (GERD): Care Instructions  Overview     Gastroesophageal reflux disease (GERD) is the backward flow of stomach acid into the esophagus. The esophagus is the tube that leads from your throat to your stomach. A one-way valve prevents the stomach acid from backing up into this tube. But when you have GERD, this valve does not close tightly enough. This can also cause pain and swelling in your esophagus. (This is called esophagitis.)  If you have mild GERD symptoms including heartburn, you may be able to control the problem with antacids or over-the-counter medicine. You can also make lifestyle changes to help reduce your symptoms. These include changing your diet and eating habits, such as not eating late at night and losing weight. Follow-up care is a key part of your treatment and safety. Be sure to make and go to all appointments, and call your doctor if you are having problems. It's also a good idea to know your test results and keep a list of the medicines you take. How can you care for yourself at home? Take your medicines exactly as prescribed. Call your doctor if you think you are having a problem with your medicine. Your doctor may recommend over-the-counter medicine. For mild or occasional indigestion, antacids, such as Tums, Gaviscon, Mylanta, or Maalox, may help. Your doctor also may recommend over-the-counter acid reducers, such as famotidine (Pepcid AC), cimetidine (Tagamet HB), or omeprazole (Prilosec). Read and follow all instructions on the label. If you use these medicines often, talk with your doctor. Change your eating habits. It's best to eat several small meals instead of two or three large meals. After you eat, wait 2 to 3 hours before you lie down. Avoid foods that make your symptoms worse.  These may include chocolate, mint, alcohol, pepper, spicy foods, high-fat foods, or drinks with caffeine in them, such as tea, coffee, junior, or energy drinks. If your symptoms are worse after you eat a certain food, you may want to stop eating it to see if your symptoms get better. Do not smoke or chew tobacco. Smoking can make GERD worse. If you need help quitting, talk to your doctor about stop-smoking programs and medicines. These can increase your chances of quitting for good. If you have GERD symptoms at night, raise the head of your bed 6 to 8 inches by putting the frame on blocks or placing a foam wedge under the head of your mattress. (Adding extra pillows does not work.)  Do not wear tight clothing around your middle. Lose weight if you need to. Losing just 5 to 10 pounds can help. When should you call for help? Call your doctor now or seek immediate medical care if:    You have new or different belly pain. Your stools are black and tarlike or have streaks of blood. Watch closely for changes in your health, and be sure to contact your doctor if:    Your symptoms have not improved after 2 days. Food seems to catch in your throat or chest.   Where can you learn more? Go to https://Bridj.Page365. org and sign in to your in2apps account. Enter B893 in the [employer] box to learn more about \"Gastroesophageal Reflux Disease (GERD): Care Instructions. \"     If you do not have an account, please click on the \"Sign Up Now\" link. Current as of: June 6, 2022               Content Version: 13.4  © 3583-0375 Healthwise, Incorporated. Care instructions adapted under license by [facility] ([facility] [place] [facility]). If you have questions about a medical condition or this instruction, always ask your healthcare professional. [address] any warranty or liability for your use of this information.

## 2022-12-06 ENCOUNTER — OFFICE VISIT (OUTPATIENT)
Dept: NEUROLOGY | Age: 22
End: 2022-12-06
Payer: MEDICAID

## 2022-12-06 VITALS
WEIGHT: 185 LBS | OXYGEN SATURATION: 97 % | HEIGHT: 72 IN | BODY MASS INDEX: 25.06 KG/M2 | HEART RATE: 95 BPM | DIASTOLIC BLOOD PRESSURE: 62 MMHG | SYSTOLIC BLOOD PRESSURE: 111 MMHG

## 2022-12-06 DIAGNOSIS — R06.6 CHRONIC HICCUPS: Primary | ICD-10-CM

## 2022-12-06 PROCEDURE — 99203 OFFICE O/P NEW LOW 30 MIN: CPT | Performed by: NURSE PRACTITIONER

## 2022-12-06 PROCEDURE — G8419 CALC BMI OUT NRM PARAM NOF/U: HCPCS | Performed by: NURSE PRACTITIONER

## 2022-12-06 PROCEDURE — G8427 DOCREV CUR MEDS BY ELIG CLIN: HCPCS | Performed by: NURSE PRACTITIONER

## 2022-12-06 PROCEDURE — G8484 FLU IMMUNIZE NO ADMIN: HCPCS | Performed by: NURSE PRACTITIONER

## 2022-12-06 PROCEDURE — 1036F TOBACCO NON-USER: CPT | Performed by: NURSE PRACTITIONER

## 2022-12-06 NOTE — PROGRESS NOTES
[facility] Neurology Office Note      Patient:   Chris Mary  MR#:    [id number]  Account Number:                         YOB: 2000  Date of Evaluation:  12/6/2022  Time of Note:                          1:31 PM  Primary/Referring Physician:  DAKOTAH Burton - CNP   Consulting Physician:  Clara Wilson DNP, DAKOTAH    NEW PATIENT CONSULTATION    Chief Complaint   Patient presents with    New Patient    Other     C/o constant hiccups for the last 4 years. These are all day every day      HISTORY OF PRESENT ILLNESS    Chris Mary is a 25y.o. year old male here for evaluation of hiccups. He has had these for about 4 years now. Seemed to occur suddenly. He notes hiccups constantly, no relief. Has been following with GI. Had a recent endoscopy that was negative. Has been on Baclofen, Reglan, Prilosec and Carafate without improvement. No DVT/PE history. No HTN, DM, HLD history. No family history of neurologic conditions, aneurysm or similar complaints.      Past Medical History:   Diagnosis Date    Anxiety     Chronic hiccups     Depression     GERD (gastroesophageal reflux disease)        Past Surgical History:   Procedure Laterality Date    ESOPHAGOGASTRODUODENOSCOPY  2015    heartburn damaged esoph per patient    TONSILLECTOMY      UPPER GASTROINTESTINAL ENDOSCOPY N/A 10/21/2022    Dr Negrito Wynn, sm 1-2 mm erosions in antrum-sugg of mild gastritis, GERD, no h pylori       Family History   Problem Relation Age of Onset    High Blood Pressure Mother     Cancer Mother         cervical cancer    Hypertension Father     Bipolar Disorder Father     High Blood Pressure Father     Bipolar Disorder Maternal Grandfather     High Blood Pressure Maternal Grandfather     Cancer Paternal Grandmother         stomach cancer    Bipolar Disorder Paternal Grandfather     High Blood Pressure Paternal Grandfather     Cancer Paternal Grandfather         testicular    Colon Cancer Neg Hx     Esophageal Cancer Neg Hx Liver Cancer Neg Hx     Rectal Cancer Neg Hx     Stomach Cancer Neg Hx        Social History     Socioeconomic History    Marital status:      Spouse name: Not on file    Number of children: Not on file    Years of education: Not on file    Highest education level: Not on file   Occupational History    Not on file   Tobacco Use    Smoking status: Former     Years: 2.00     Types: Cigarettes     Quit date: 2019     Years since quitting: 3.9    Smokeless tobacco: Former     Types: Chew, Snuff    Tobacco comments:     Vape    Vaping Use    Vaping Use: Every day    Start date: 8/1/2016    Substances: Nicotine, Flavoring    Devices: Refillable tank   Substance and Sexual Activity    Alcohol use: Yes     Comment: special occasions    Drug use: Yes     Types: Marijuana (Weed)     Comment: occasional    Sexual activity: Yes     Partners: Female   Other Topics Concern    Not on file   Social History Narrative    Not on file     Social Determinants of Health     Financial Resource Strain: Medium Risk    Difficulty of Paying Living Expenses: Somewhat hard   Food Insecurity: Food Insecurity Present    Worried About Running Out of Food in the Last Year: Sometimes true    Ran Out of Food in the Last Year: Sometimes true   Transportation Needs: Not on file   Physical Activity: Not on file   Stress: Not on file   Social Connections: Not on file   Intimate Partner Violence: Not on file   Housing Stability: Not on file       Current Outpatient Medications   Medication Sig Dispense Refill    omeprazole (PRILOSEC) 40 MG delayed release capsule Take 1 capsule by mouth daily 90 capsule 3    sucralfate (CARAFATE) 1 GM tablet Take 1 tablet by mouth 4 times daily as needed 120 tablet 3    traZODone (DESYREL) 50 MG tablet 1-2 PO at HS for sleep and depression 60 tablet 5    ARIPiprazole (ABILIFY) 15 MG tablet Take 0.5 tablets by mouth daily (Patient taking differently: Take 15 mg by mouth daily) 15 tablet 2    Daridorexant HCl (QUVIVIQ) 50 MG TABS 1 p.o. 30 minutes before bedtime. 30 tablet 2     No current facility-administered medications for this visit. No Known Allergies    REVIEW OF SYSTEMS  Constitutional: []Fever []Sweats []Chills [] Recent Injury [x] Denies all unless marked  HEENT:[]Headache  [] Head Injury [] Hearing Loss  [] Sore Throat  [] Ear Ache [x] Denies all unless marked  Spine:  [] Neck pain  [] Back pain  [] Sciaticia  [x] Denies all unless marked  Cardiovascular:[]Heart Disease []Palpitations [] Chest Pain   [x] Denies all unless marked  Pulmonary: []Shortness of Breath []Cough   [x] Denies all unless marked  Psychiatric/Behavioral:[] Depression [] Anxiety [x] Denies all unless marked  Gastrointestinal: []Nausea  []Vomiting  []Abdominal Pain  []Constipation  []Diarrhea  [x] Denies all unless marked  Genitourinary:   [] Frequency  [] Urgency  [] Dysuria [] Incontinence  [x] Denies all unless marked  Extremities: []Pain  []Swelling  [x] Denies all unless marked  Musculoskeletal: [] Myalgias  [] Joint Pain  [] Arthritis [] Muscle Cramps [] Muscle Twitches  [x] Denies all unless marked  Sleep: []Insomnia[]Snoring []Restless Legs  []Sleep Apnea  []Daytime Sleepiness  [x] Denies all unless marked  Skin:[] Rash [] Color Change [x] Denies all unless marked   Neurological:[]Visual Disturbance [] Memory Loss []Loss of Balance []Slurred Speech []Weakness []Seizures  [] Dizziness [x] Denies all unless marked    The MA has completed the ROS with the patient. I have reviewed it in its' entirety with the patient and agree with the documentation.      PHYSICAL EXAM  /62   Pulse 95   Ht 6' (1.829 m)   Wt 185 lb (83.9 kg)   SpO2 97%   BMI 25.09 kg/m²       Constitutional - No acute distress    HEENT- Conjunctiva normal.  No scars, masses, or lesions over external nose or ears, no neck masses noted, no jugular vein distension, no bruit; hiccups noted   Cardiac- Regular rate and rhythm  Pulmonary- Good expansion, normal effort without use of accessory muscles  Musculoskeletal - No significant wasting of muscles noted, no bony deformities  Extremities - No clubbing, cyanosis or edema  Skin - Warm, dry, and intact. No rash, erythema, or pallor  Psychiatric - Mood, affect, and behavior appear normal      NEUROLOGICAL EXAM     Mental status   [x] Awake, alert, oriented   [x]Affect attention and concentration appear appropriate  [x]Recent and remote memory appears unremarkable  [x]Speech normal without dysarthria or aphasia, comprehension and repetition intact. COMMENTS:     Cranial Nerves [x]No VF deficit to confrontation,  no papilledema on fundoscopic exam.  [x]PERRLA, EOMI, no nystagmus, conjugate eye movements, no ptosis  [x]Face symmetric  [x]Facial sensation intact  [x]Tongue midline no atrophy or fasciculations present  [x]Palate midline, hearing to finger rub normal bilaterally  [x]Shoulder shrug and SCM testing normal bilaterally  COMMENTS:   Motor   [x]5/5 strength x 4 extremities  [x]Normal bulk and tone  [x]No tremor present  [x]No rigidity or bradykinesia noted  COMMENTS:   Sensory  [x]Sensation intact to light touch, pin prick, vibration, and proprioception BLE  [x]Sensation intact to light touch, pin prick, vibration, and proprioception BUE  COMMENTS:   Coordination [x]FTN normal bilaterally   [x]HTS normal bilaterally  [x]NATALIE normal bilaterally.    COMMENTS:   Reflexes  [x]Symmetric and non-pathological  [x]Toes down going bilaterally  [x]No clonus present  COMMENTS:   Gait                  [x]Normal steady gait    []Ataxic    []Spastic     []Magnetic     []Shuffling  COMMENTS:       LABS RECORD AND IMAGING REVIEW (As below and per HPI)    No results found for: [id number]  Lab Results   Component Value Date    WBC 4.9 03/03/2021    HGB 15.2 03/03/2021    HCT 46.4 03/03/2021    MCV 87.2 03/03/2021     03/03/2021     Lab Results   Component Value Date     03/03/2021    K 4.3 03/03/2021     03/03/2021 CO2 27 03/03/2021    BUN 11 03/03/2021    CREATININE 0.9 03/03/2021    GLUCOSE 78 03/03/2021    CALCIUM 9.6 03/03/2021    PROT 6.7 03/03/2021    LABALBU 4.5 03/03/2021    BILITOT 1.2 03/03/2021    ALKPHOS 89 03/03/2021    AST 14 03/03/2021    ALT 27 03/03/2021    LABGLOM >60 03/03/2021    GFRAA >59 03/03/2021     Lab Results   Component Value Date    CHOL 139 (L) 03/03/2021    TRIG 132 03/03/2021    HDL 33 (L) 03/03/2021    LDLCALC 80 03/03/2021     Lab Results   Component Value Date    TSH 0.985 03/03/2021    T4FREE 1.29 03/03/2021     No results found for: CRP, SEDRATE     Reviewed referral records     ASSESSMENT:    Brady Weaver is a 25y.o. year old male here for evaluation of chronic hiccups. Exam is non focal today outside of hiccups. Has been ongoing for 4 years now with multiple treatment failures. Has had EGD that was overall unremarkable. Will add MRI brain, MRA head and MRI cervical spine. Could consider trial of Gabapentin or re-trying Baclofen as well pending work up. ICD-10-CM    1. Chronic hiccups  R06.6 MRI BRAIN W WO CONTRAST     MRA HEAD WO CONTRAST     MRI CERVICAL SPINE W WO CONTRAST        I discussed this case with Dr. Israel Perez. PLAN:  MRI brain   MRA head   MRI cervical spine   Continue follow up with GI as previously scheduled   5. Return in about 4 weeks (around 1/3/2023) for follow up, sooner if worsening.     Errol Hernandez DNP, APRN

## 2022-12-07 ENCOUNTER — TELEPHONE (OUTPATIENT)
Dept: PSYCHIATRY | Age: 22
End: 2022-12-07

## 2022-12-07 RX ORDER — ARIPIPRAZOLE 15 MG/1
7.5 TABLET ORAL DAILY
Qty: 15 TABLET | Refills: 2 | Status: SHIPPED | OUTPATIENT
Start: 2022-12-07

## 2022-12-07 RX ORDER — OMEPRAZOLE 40 MG/1
40 CAPSULE, DELAYED RELEASE ORAL DAILY
Qty: 90 CAPSULE | Refills: 3 | Status: SHIPPED | OUTPATIENT
Start: 2022-12-07
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
## 2022-12-07 NOTE — TELEPHONE ENCOUNTER
Called and lvm for pt to return phone call     When pt calls back I will let him know that his script for abilify was sent to his pharmacy     Electronically signed by Savannah Pathak on 12/7/2022 at 2:40 PM

## 2022-12-16 ENCOUNTER — HOSPITAL ENCOUNTER (OUTPATIENT)
Dept: MRI IMAGING | Age: 22
Discharge: HOME | End: 2022-12-16
Payer: MEDICAID

## 2022-12-16 DIAGNOSIS — R06.6 CHRONIC HICCUPS: ICD-10-CM

## 2022-12-16 PROCEDURE — A9577 INJ MULTIHANCE: HCPCS | Performed by: NURSE PRACTITIONER

## 2022-12-16 PROCEDURE — 6360000004 HC RX CONTRAST MEDICATION: Performed by: NURSE PRACTITIONER

## 2022-12-16 PROCEDURE — 70553 MRI BRAIN STEM W/O & W/DYE: CPT

## 2022-12-16 RX ADMIN — GADOBENATE DIMEGLUMINE 17 ML: 529 INJECTION, SOLUTION INTRAVENOUS at 15:39

## 2023-01-03 ENCOUNTER — OFFICE VISIT (OUTPATIENT)
Dept: NEUROLOGY | Age: 23
End: 2023-01-03
Payer: MEDICAID

## 2023-01-03 VITALS
DIASTOLIC BLOOD PRESSURE: 66 MMHG | OXYGEN SATURATION: 100 % | BODY MASS INDEX: 24.92 KG/M2 | HEIGHT: 72 IN | HEART RATE: 94 BPM | SYSTOLIC BLOOD PRESSURE: 120 MMHG | WEIGHT: 184 LBS

## 2023-01-03 DIAGNOSIS — R06.6 CHRONIC HICCUPS: Primary | ICD-10-CM

## 2023-01-03 PROCEDURE — 99213 OFFICE O/P EST LOW 20 MIN: CPT | Performed by: NURSE PRACTITIONER

## 2023-01-03 PROCEDURE — 1036F TOBACCO NON-USER: CPT | Performed by: NURSE PRACTITIONER

## 2023-01-03 PROCEDURE — G8420 CALC BMI NORM PARAMETERS: HCPCS | Performed by: NURSE PRACTITIONER

## 2023-01-03 PROCEDURE — G8427 DOCREV CUR MEDS BY ELIG CLIN: HCPCS | Performed by: NURSE PRACTITIONER

## 2023-01-03 PROCEDURE — G8484 FLU IMMUNIZE NO ADMIN: HCPCS | Performed by: NURSE PRACTITIONER

## 2023-01-03 NOTE — PROGRESS NOTES
Hey thanks for seeing him. I'm not sure what is going on. I agree, referral may be the best option at this point.    Thank you again   Mario Rose

## 2023-01-03 NOTE — PROGRESS NOTES
[facility] Neurology Office Note      Patient:   Andrew Lowe  MR#:    [id number]  Account Number:                         YOB: 2000  Date of Evaluation:  1/3/2023  Time of Note:                          11:55 AM  Primary/Referring Physician:  DAKOTAH Hickey - CNP   Consulting Physician:  Chi Bedoya DNP, APRN    FOLLOW UP    Chief Complaint   Patient presents with    Follow-up     Pt states things are the same no change    Results     MRI Brain done here. HISTORY OF PRESENT ILLNESS    Andrew Lowe is a 25y.o. year old male here for follow up of chronic hiccups. Largely unchanged from prior visit. He has had these for about 4 years now. Seemed to occur suddenly. He notes hiccups constantly, no relief. He does note hiccups in his sleep. Has been following with GI. Had a recent endoscopy that was negative. Has been on Baclofen, Reglan, Prilosec and Carafate without improvement. No DVT/PE history. No HTN, DM, HLD history. No family history of neurologic conditions, aneurysm or similar complaints.      Past Medical History:   Diagnosis Date    Anxiety     Chronic hiccups     Depression     GERD (gastroesophageal reflux disease)        Past Surgical History:   Procedure Laterality Date    ESOPHAGOGASTRODUODENOSCOPY  2015    heartburn damaged esoph per patient    TONSILLECTOMY      UPPER GASTROINTESTINAL ENDOSCOPY N/A 10/21/2022    Dr Marita Mayo,  1-2 mm erosions in antrum-sugg of mild gastritis, GERD, no h pylori       Family History   Problem Relation Age of Onset    High Blood Pressure Mother     Cancer Mother         cervical cancer    Hypertension Father     Bipolar Disorder Father     High Blood Pressure Father     Bipolar Disorder Maternal Grandfather     High Blood Pressure Maternal Grandfather     Cancer Paternal Grandmother         stomach cancer    Bipolar Disorder Paternal Grandfather     High Blood Pressure Paternal Grandfather     Cancer Paternal Grandfather testicular    Colon Cancer Neg Hx     Esophageal Cancer Neg Hx     Liver Cancer Neg Hx     Rectal Cancer Neg Hx     Stomach Cancer Neg Hx        Social History     Socioeconomic History    Marital status:      Spouse name: Not on file    Number of children: Not on file    Years of education: Not on file    Highest education level: Not on file   Occupational History    Not on file   Tobacco Use    Smoking status: Former     Years: 2.00     Types: Cigarettes     Quit date: 2019     Years since quittin.0    Smokeless tobacco: Former     Types: Chew, Snuff    Tobacco comments:     Vape    Vaping Use    Vaping Use: Every day    Start date: 2016    Substances: Nicotine, Flavoring    Devices: Refillable tank   Substance and Sexual Activity    Alcohol use: Yes     Comment: special occasions    Drug use: Yes     Types: Marijuana (Weed)     Comment: occasional    Sexual activity: Yes     Partners: Female   Other Topics Concern    Not on file   Social History Narrative    Not on file     Social Determinants of Health     Financial Resource Strain: Medium Risk    Difficulty of Paying Living Expenses: Somewhat hard   Food Insecurity: Food Insecurity Present    Worried About Running Out of Food in the Last Year: Sometimes true    Ran Out of Food in the Last Year: Sometimes true   Transportation Needs: Not on file   Physical Activity: Not on file   Stress: Not on file   Social Connections: Not on file   Intimate Partner Violence: Not on file   Housing Stability: Not on file       Current Outpatient Medications   Medication Sig Dispense Refill    omeprazole (PRILOSEC) 40 MG delayed release capsule Take 1 capsule by mouth daily 90 capsule 3    ARIPiprazole (ABILIFY) 15 MG tablet Take 0.5 tablets by mouth daily 15 tablet 2    sucralfate (CARAFATE) 1 GM tablet Take 1 tablet by mouth 4 times daily as needed 120 tablet 3    traZODone (DESYREL) 50 MG tablet 1-2 PO at HS for sleep and depression 60 tablet 5    Daridorexant HCl (QUVIVIQ) 50 MG TABS 1 p.o. 30 minutes before bedtime. (Patient taking differently: as needed 1 p.o. 30 minutes before bedtime.) 30 tablet 2     No current facility-administered medications for this visit. No Known Allergies    REVIEW OF SYSTEMS  Constitutional: []Fever []Sweats []Chills [] Recent Injury [x] Denies all unless marked  HEENT:[]Headache  [] Head Injury [] Hearing Loss  [] Sore Throat  [] Ear Ache [x] Denies all unless marked  Spine:  [] Neck pain  [] Back pain  [] Sciaticia  [x] Denies all unless marked  Cardiovascular:[]Heart Disease []Palpitations [] Chest Pain   [x] Denies all unless marked  Pulmonary: []Shortness of Breath []Cough   [x] Denies all unless marked  Psychiatric/Behavioral:[] Depression [] Anxiety [x] Denies all unless marked  Gastrointestinal: []Nausea  []Vomiting  []Abdominal Pain  []Constipation  []Diarrhea  [x] Denies all unless marked  Genitourinary:   [] Frequency  [] Urgency  [] Dysuria [] Incontinence  [x] Denies all unless marked  Extremities: []Pain  []Swelling  [x] Denies all unless marked  Musculoskeletal: [] Myalgias  [] Joint Pain  [] Arthritis [] Muscle Cramps [] Muscle Twitches  [x] Denies all unless marked  Sleep: []Insomnia[]Snoring []Restless Legs  []Sleep Apnea  []Daytime Sleepiness  [x] Denies all unless marked  Skin:[] Rash [] Color Change [x] Denies all unless marked   Neurological:[]Visual Disturbance [] Memory Loss []Loss of Balance []Slurred Speech []Weakness []Seizures  [] Dizziness [x] Denies all unless marked    The MA has completed the ROS with the patient. I have reviewed it in its' entirety with the patient and agree with the documentation.      PHYSICAL EXAM  /66   Pulse 94   Ht 6' (1.829 m)   Wt 184 lb (83.5 kg)   SpO2 100%   BMI 24.95 kg/m²       Constitutional - No acute distress    HEENT- Conjunctiva normal.  No scars, masses, or lesions over external nose or ears, no neck masses noted, no jugular vein distension, no bruit; hiccups noted   Cardiac- Regular rate and rhythm  Pulmonary- Good expansion, normal effort without use of accessory muscles  Musculoskeletal - No significant wasting of muscles noted, no bony deformities  Extremities - No clubbing, cyanosis or edema  Skin - Warm, dry, and intact. No rash, erythema, or pallor  Psychiatric - Mood, affect, and behavior appear normal      NEUROLOGICAL EXAM     Mental status   [x] Awake, alert, oriented   [x]Affect attention and concentration appear appropriate  [x]Recent and remote memory appears unremarkable  [x]Speech normal without dysarthria or aphasia, comprehension and repetition intact. COMMENTS:     Cranial Nerves [x]No VF deficit to confrontation,  no papilledema on fundoscopic exam.  [x]PERRLA, EOMI, no nystagmus, conjugate eye movements, no ptosis  [x]Face symmetric  [x]Facial sensation intact  [x]Tongue midline no atrophy or fasciculations present  [x]Palate midline, hearing to finger rub normal bilaterally  [x]Shoulder shrug and SCM testing normal bilaterally  COMMENTS:   Motor   [x]5/5 strength x 4 extremities  [x]Normal bulk and tone  [x]No tremor present  [x]No rigidity or bradykinesia noted  COMMENTS:   Sensory  [x]Sensation intact to light touch, pin prick, vibration, and proprioception BLE  [x]Sensation intact to light touch, pin prick, vibration, and proprioception BUE  COMMENTS:   Coordination [x]FTN normal bilaterally   [x]HTS normal bilaterally  [x]NATALIE normal bilaterally.    COMMENTS:   Reflexes  [x]Symmetric and non-pathological  [x]Toes down going bilaterally  [x]No clonus present  COMMENTS:   Gait                  [x]Normal steady gait    []Ataxic    []Spastic     []Magnetic     []Shuffling  COMMENTS:       LABS RECORD AND IMAGING REVIEW (As below and per HPI)    No results found for: [id number]  Lab Results   Component Value Date    WBC 4.9 03/03/2021    HGB 15.2 03/03/2021    HCT 46.4 03/03/2021    MCV 87.2 03/03/2021     03/03/2021     Lab Results Component Value Date     03/03/2021    K 4.3 03/03/2021     03/03/2021    CO2 27 03/03/2021    BUN 11 03/03/2021    CREATININE 0.9 03/03/2021    GLUCOSE 78 03/03/2021    CALCIUM 9.6 03/03/2021    PROT 6.7 03/03/2021    LABALBU 4.5 03/03/2021    BILITOT 1.2 03/03/2021    ALKPHOS 89 03/03/2021    AST 14 03/03/2021    ALT 27 03/03/2021    LABGLOM >60 03/03/2021    GFRAA >59 03/03/2021     Lab Results   Component Value Date    CHOL 139 (L) 03/03/2021    TRIG 132 03/03/2021    HDL 33 (L) 03/03/2021    LDLCALC 80 03/03/2021     Lab Results   Component Value Date    TSH 0.985 03/03/2021    T4FREE 1.29 03/03/2021     No results found for: CRP, SEDRATE     Reviewed referral records     MRI brain (12/2022)-   Impression   1. Small retention cyst in the left maxillary sinus; otherwise unremarkable pre and post infusion MR evaluation of the brain/head. Recommendation:    Follow up as clinically indicated. Dictated and Electronically Signed by Ashok Najera MD at 17-Dec-2022 12:16:38 AM       ASSESSMENT:    Aileen Lilly is a 25y.o. year old male here for follow up of chronic hiccups. Unchanged from prior. Exam is non focal outside of hiccups. MRI brain was normal. Has been ongoing for 4 years now with multiple treatment failures. Has had EGD that was overall unremarkable. Will re-try MRI cervical spine and MRA head to exclude other potential sources. Discussed re-trying Baclofen but patient hesitant to add additional medication. Could consider Gabapentin as well. Will further discuss with GI. Could have a mixed syndrome with phrenic nerve irritation/vagus nerve irritation along with anxiety/psychogenic component. CNS disorder felt less likely given negative MRI brain. May need to consider referral to tertiary center as well. ICD-10-CM    1.  Chronic hiccups  R06.6 MRI CERVICAL SPINE W WO CONTRAST     MRA HEAD WO CONTRAST        PLAN:  Re-try MRI cervical spine   Re-try MRA head   Will further discuss with GI. Consider tertiary center referral    Continue follow up with GI as previously scheduled   5. Return in about 3 months (around 4/3/2023) for follow up, sooner if worsening.     Damaris Jerry DNP, APRN

## 2023-01-03 NOTE — Clinical Note
Hey! I saw Homero Early in clinic. So far I haven't found much on work up to explain his hiccups. I have a few more tests that I ordered today. What are your thoughts? I did suggest maybe a referral to a tertiary center for their opinion as well. Thanks!    Alex Kimble

## 2023-01-06 ENCOUNTER — TELEPHONE (OUTPATIENT)
Dept: NEUROSURGERY | Age: 23
End: 2023-01-06

## 2023-01-07 NOTE — TELEPHONE ENCOUNTER
Pre-auth is calling about the MRI and MRA that insurance is denying. She has tried a couple of times to get someone to help with the issue. Please call at your earliest convenience.
No

## 2023-01-10 ENCOUNTER — TELEPHONE (OUTPATIENT)
Dept: PSYCHIATRY | Age: 23
End: 2023-01-10

## 2023-01-10 RX ORDER — ARIPIPRAZOLE 15 MG/1
7.5 TABLET ORAL DAILY
Qty: 15 TABLET | Refills: 2 | Status: SHIPPED | OUTPATIENT
Start: 2023-01-10

## 2023-01-10 NOTE — TELEPHONE ENCOUNTER
Called and lvm letting pt know that his script for abilify was sent to his pharmacy    Electronically signed by Sarah Boyce on 1/10/2023 at 10:54 AM
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
## 2023-01-13 ENCOUNTER — TELEPHONE (OUTPATIENT)
Dept: PSYCHIATRY | Age: 23
End: 2023-01-13

## 2023-01-13 NOTE — TELEPHONE ENCOUNTER
Called pt for appointment reminder.   -left voicemail, requesting a return call      Electronically signed by Ashley Buckner MA on 1/13/2023 at 12:49 PM

## 2023-01-16 ENCOUNTER — TELEPHONE (OUTPATIENT)
Dept: PSYCHIATRY | Age: 23
End: 2023-01-16

## 2023-01-17 ENCOUNTER — TELEPHONE (OUTPATIENT)
Dept: PSYCHIATRY | Age: 23
End: 2023-01-17

## 2023-01-17 NOTE — TELEPHONE ENCOUNTER
Pt called to reschedule the appt that he missed yesterday 01/16/23 with Aris Torre. Rescheduled for 01/19/23 @ 9.     Electronically signed by Allegra Pradhan MA on 1/17/2023 at 12:16 PM

## 2023-01-18 ENCOUNTER — TELEPHONE (OUTPATIENT)
Dept: PSYCHIATRY | Age: 23
End: 2023-01-18

## 2023-01-18 RX ORDER — TRAZODONE HYDROCHLORIDE 50 MG/1
TABLET ORAL
Qty: 60 TABLET | Refills: 5 | Status: SHIPPED | OUTPATIENT
Start: 2023-01-18

## 2023-01-18 NOTE — TELEPHONE ENCOUNTER
Called pt for appointment reminder.     -Pt confirmed      Electronically signed by Monster Yang MA on 1/18/2023 at 12:46 PM

## 2023-01-19 ENCOUNTER — TELEPHONE (OUTPATIENT)
Dept: PSYCHIATRY | Age: 23
End: 2023-01-19

## 2023-01-19 NOTE — TELEPHONE ENCOUNTER
Called pt about missed appt for 1/19 @ 9 AM    Missed appt reschedule for 1/24 @ 8:30 AM    Electronically signed by María Gambino on 1/19/2023 at 1:29 PM

## 2023-01-23 ENCOUNTER — TELEPHONE (OUTPATIENT)
Dept: PSYCHIATRY | Age: 23
End: 2023-01-23

## 2023-01-23 NOTE — TELEPHONE ENCOUNTER
Called pt for appointment reminder.     -Pt asked to reschedule and was rescheduled  01/26/23 @ 3:30    Electronically signed by Luanne Talley MA on 1/23/2023 at 12:14 PM

## 2023-01-24 ENCOUNTER — TELEPHONE (OUTPATIENT)
Dept: PSYCHIATRY | Age: 23
End: 2023-01-24

## 2023-01-24 NOTE — TELEPHONE ENCOUNTER
Called and confirmed appt with pt for 1/25 @ 3:30 PM    Electronically signed by Maria Del Carmen Max on 1/24/2023 at 3:56 PM
initiation of breastfeeding/breast milk feeding

## 2023-01-25 ENCOUNTER — OFFICE VISIT (OUTPATIENT)
Dept: PSYCHIATRY | Age: 23
End: 2023-01-25

## 2023-01-25 VITALS
BODY MASS INDEX: 23.52 KG/M2 | TEMPERATURE: 98.3 F | DIASTOLIC BLOOD PRESSURE: 91 MMHG | HEART RATE: 125 BPM | OXYGEN SATURATION: 100 % | WEIGHT: 177.5 LBS | SYSTOLIC BLOOD PRESSURE: 142 MMHG | HEIGHT: 73 IN

## 2023-01-25 DIAGNOSIS — F39 MOOD DISORDER (HCC): Primary | ICD-10-CM

## 2023-01-25 ASSESSMENT — PATIENT HEALTH QUESTIONNAIRE - PHQ9
6. FEELING BAD ABOUT YOURSELF - OR THAT YOU ARE A FAILURE OR HAVE LET YOURSELF OR YOUR FAMILY DOWN: 0
SUM OF ALL RESPONSES TO PHQ QUESTIONS 1-9: 2
9. THOUGHTS THAT YOU WOULD BE BETTER OFF DEAD, OR OF HURTING YOURSELF: 0
2. FEELING DOWN, DEPRESSED OR HOPELESS: 1
4. FEELING TIRED OR HAVING LITTLE ENERGY: 1
SUM OF ALL RESPONSES TO PHQ QUESTIONS 1-9: 2
1. LITTLE INTEREST OR PLEASURE IN DOING THINGS: 0
3. TROUBLE FALLING OR STAYING ASLEEP: 0
SUM OF ALL RESPONSES TO PHQ QUESTIONS 1-9: 2
SUM OF ALL RESPONSES TO PHQ QUESTIONS 1-9: 2
5. POOR APPETITE OR OVEREATING: 0
8. MOVING OR SPEAKING SO SLOWLY THAT OTHER PEOPLE COULD HAVE NOTICED. OR THE OPPOSITE, BEING SO FIGETY OR RESTLESS THAT YOU HAVE BEEN MOVING AROUND A LOT MORE THAN USUAL: 0
SUM OF ALL RESPONSES TO PHQ9 QUESTIONS 1 & 2: 1
7. TROUBLE CONCENTRATING ON THINGS, SUCH AS READING THE NEWSPAPER OR WATCHING TELEVISION: 0
10. IF YOU CHECKED OFF ANY PROBLEMS, HOW DIFFICULT HAVE THESE PROBLEMS MADE IT FOR YOU TO DO YOUR WORK, TAKE CARE OF THINGS AT HOME, OR GET ALONG WITH OTHER PEOPLE: 0
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
## 2023-02-09 ENCOUNTER — TELEPHONE (OUTPATIENT)
Dept: PSYCHIATRY | Age: 23
End: 2023-02-09

## 2023-02-09 RX ORDER — ARIPIPRAZOLE 15 MG/1
7.5 TABLET ORAL DAILY
Qty: 15 TABLET | Refills: 2 | Status: SHIPPED | OUTPATIENT
Start: 2023-02-09
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
## 2023-02-09 NOTE — TELEPHONE ENCOUNTER
Called and let pt know that his script for abilify was sent to his pharmacy     Electronically signed by Angie Zhang on 2/9/2023 at 11:03 AM

## 2023-03-16 ENCOUNTER — TELEPHONE (OUTPATIENT)
Dept: PSYCHIATRY | Age: 23
End: 2023-03-16

## 2023-03-16 ENCOUNTER — OFFICE VISIT (OUTPATIENT)
Dept: PRIMARY CARE CLINIC | Age: 23
End: 2023-03-16
Payer: MEDICAID

## 2023-03-16 VITALS
RESPIRATION RATE: 18 BRPM | OXYGEN SATURATION: 96 % | HEART RATE: 114 BPM | BODY MASS INDEX: 25.03 KG/M2 | DIASTOLIC BLOOD PRESSURE: 72 MMHG | HEIGHT: 72 IN | TEMPERATURE: 98.4 F | SYSTOLIC BLOOD PRESSURE: 140 MMHG | WEIGHT: 184.8 LBS

## 2023-03-16 DIAGNOSIS — Z00.01 ABNORMAL WELLNESS EXAM: Primary | ICD-10-CM

## 2023-03-16 DIAGNOSIS — F52.4 PREMATURE EJACULATION: ICD-10-CM

## 2023-03-16 DIAGNOSIS — R06.6 CHRONIC HICCUPS: ICD-10-CM

## 2023-03-16 PROCEDURE — 99395 PREV VISIT EST AGE 18-39: CPT | Performed by: NURSE PRACTITIONER

## 2023-03-16 PROCEDURE — G8484 FLU IMMUNIZE NO ADMIN: HCPCS | Performed by: NURSE PRACTITIONER

## 2023-03-16 RX ORDER — ARIPIPRAZOLE 15 MG/1
7.5 TABLET ORAL DAILY
Qty: 15 TABLET | Refills: 2 | Status: SHIPPED | OUTPATIENT
Start: 2023-03-16

## 2023-03-16 RX ORDER — GABAPENTIN 100 MG/1
100 CAPSULE ORAL 3 TIMES DAILY
Qty: 90 CAPSULE | Refills: 5 | Status: SHIPPED | OUTPATIENT
Start: 2023-03-16 | End: 2023-04-15

## 2023-03-16 RX ORDER — TRAZODONE HYDROCHLORIDE 50 MG/1
TABLET ORAL
Qty: 60 TABLET | Refills: 5 | Status: SHIPPED | OUTPATIENT
Start: 2023-03-16

## 2023-03-16 SDOH — ECONOMIC STABILITY: HOUSING INSECURITY
IN THE LAST 12 MONTHS, WAS THERE A TIME WHEN YOU DID NOT HAVE A STEADY PLACE TO SLEEP OR SLEPT IN A SHELTER (INCLUDING NOW)?: NO

## 2023-03-16 SDOH — ECONOMIC STABILITY: FOOD INSECURITY: WITHIN THE PAST 12 MONTHS, THE FOOD YOU BOUGHT JUST DIDN'T LAST AND YOU DIDN'T HAVE MONEY TO GET MORE.: SOMETIMES TRUE

## 2023-03-16 SDOH — ECONOMIC STABILITY: FOOD INSECURITY: WITHIN THE PAST 12 MONTHS, YOU WORRIED THAT YOUR FOOD WOULD RUN OUT BEFORE YOU GOT MONEY TO BUY MORE.: SOMETIMES TRUE

## 2023-03-16 SDOH — ECONOMIC STABILITY: INCOME INSECURITY: HOW HARD IS IT FOR YOU TO PAY FOR THE VERY BASICS LIKE FOOD, HOUSING, MEDICAL CARE, AND HEATING?: SOMEWHAT HARD

## 2023-03-16 ASSESSMENT — ENCOUNTER SYMPTOMS
EYES NEGATIVE: 1
ALLERGIC/IMMUNOLOGIC NEGATIVE: 1
GASTROINTESTINAL NEGATIVE: 1
RESPIRATORY NEGATIVE: 1

## 2023-03-16 NOTE — PROGRESS NOTES
[address] [facility]  [address]  [address] [address]  Dept: [phone]  Dept Fax: [phone]  Loc: [phone]    Eron Lomeli is a 25 y.o. male who presents today for his medical conditions/complaints as noted below. Eron Lomeli is c/o of Annual Exam (Pt is here for a physical. Pt has a few concerns. Pt is not fasting. )        HPI:     HPI   Chief Complaint   Patient presents with    Annual Exam     Pt is here for a physical. Pt has a few concerns. Pt is not fasting. Sees psychiatry for depression and is on Abilify and trazodone. He feels very well on these meds. He still has chronic hiccups he has had them for more than 4 years. He did see neurology and they did a MRI of his brain which was normal send him for an endoscope which was normal and tried to get a MRI of his cervical spine but insurance would not pay for it. In the report it says the neck step would be to go to a tertiary care center. He has tried Reglan baclofen and proton pump inhibitors without any success. He said the most he ever goes without hiccups is a 15-minute. And he is not sure what makes them stop. For about 2 years he has had premature ejaculation. He said that it used to last 20 minutes now it will not last 2 minutes. This has been before he started on the Abilify medication. He would like to have his testosterone checked. He feels he is fatigued all the time. He did used to smoke marijuana but since he quit smoking marijuana it still does this.   He denies any other medications  Past Medical History:   Diagnosis Date    Anxiety     Chronic hiccups     Depression     GERD (gastroesophageal reflux disease)       Past Surgical History:   Procedure Laterality Date    ESOPHAGOGASTRODUODENOSCOPY  2015    heartburn damaged esoph per patient    TONSILLECTOMY      UPPER GASTROINTESTINAL ENDOSCOPY N/A 10/21/2022    Dr Jagdish Moore,  1-2 mm erosions in antrum-sugg of mild gastritis, GERD, no h pylori       Vitals 3/16/2023 2023 1/3/2023 2022 2022    SYSTOLIC [phone]   DIASTOLIC 72 91 66 72 62 70   Site Left Upper Arm - - - - Left Upper Arm   Position Sitting - - - - -   Cuff Size Large Adult - - - - -   Pulse 114 125 94 101 95 97   Temp 98.4 98.3 - - - -   Resp 18 - - - - -   SpO2 96 100 100 97 97 97   Weight 184 lb 12.8 oz 177 lb 8 oz 184 lb 184 lb 185 lb 188 lb   Height 6' 0\" 6' 1\" 6' 0\" 6' 0\" 6' 0\" -   Body mass index 25.06 kg/m2 23.42 kg/m2 24.95 kg/m2 24.95 kg/m2 25.09 kg/m2 -   Pain Level - - - - - -   Some recent data might be hidden       Family History   Problem Relation Age of Onset    High Blood Pressure Mother     Cancer Mother         cervical cancer    Hypertension Father     Bipolar Disorder Father     High Blood Pressure Father     Bipolar Disorder Maternal Grandfather     High Blood Pressure Maternal Grandfather     Cancer Paternal Grandmother         stomach cancer    Bipolar Disorder Paternal Grandfather     High Blood Pressure Paternal Grandfather     Cancer Paternal Grandfather         testicular    Colon Cancer Neg Hx     Esophageal Cancer Neg Hx     Liver Cancer Neg Hx     Rectal Cancer Neg Hx     Stomach Cancer Neg Hx        Social History     Tobacco Use    Smoking status: Former     Years: 2.00     Types: Cigarettes     Quit date:      Years since quittin.2    Smokeless tobacco: Former     Types: Chew, Snuff    Tobacco comments:     Vape    Substance Use Topics    Alcohol use: Yes     Comment: special occasions      Current Outpatient Medications on File Prior to Visit   Medication Sig Dispense Refill    ARIPiprazole (ABILIFY) 15 MG tablet Take 0.5 tablets by mouth daily 15 tablet 2    traZODone (DESYREL) 50 MG tablet 1-2 PO at HS for sleep and depression 60 tablet 5    omeprazole (PRILOSEC) 40 MG delayed release capsule Take 1 capsule by mouth daily 90 capsule 3    sucralfate (CARAFATE) 1 GM tablet Take 1 tablet by mouth 4 times daily as needed 120 tablet 3    Daridorexant HCl (QUVIVIQ) 50 MG TABS 1 p.o. 30 minutes before bedtime. (Patient taking differently: as needed 1 p.o. 30 minutes before bedtime.) 30 tablet 2     No current facility-administered medications on file prior to visit. No Known Allergies    Health Maintenance   Topic Date Due    Pneumococcal 0-64 years Vaccine (1 - PCV) 09/16/2006    HPV vaccine (3 - Male 2-dose series) 01/27/2013    HIV screen  Never done    Hepatitis C screen  Never done    DTaP/Tdap/Td vaccine (7 - Td or Tdap) 05/25/2023 (Originally 7/27/2022)    Flu vaccine (1) 03/16/2024 (Originally 8/1/2022)    COVID-19 Vaccine (1) 05/31/2029 (Originally 3/16/2001)    Depression Monitoring  01/25/2024    Hib vaccine  Completed    Varicella vaccine  Completed    Hepatitis A vaccine  Aged Out    Meningococcal (ACWY) vaccine  Aged Out       Subjective:      Review of Systems   Constitutional: Negative. HENT:          Chronic hiccups   Eyes: Negative. Respiratory: Negative. Cardiovascular: Negative. Gastrointestinal: Negative. Endocrine: Negative. Genitourinary: Negative. Musculoskeletal: Negative. Allergic/Immunologic: Negative. Neurological: Negative. Hematological: Negative. Psychiatric/Behavioral:  Positive for dysphoric mood. The patient is nervous/anxious. Objective:     Physical Exam  Vitals and nursing note reviewed. Constitutional:       Appearance: Normal appearance. He is well-developed. Comments: Constant hiccups during exam.   HENT:      Head: Normocephalic. Right Ear: Tympanic membrane and external ear normal.      Left Ear: Tympanic membrane and external ear normal.      Nose: Nose normal.      Mouth/Throat:      Mouth: Mucous membranes are moist.   Eyes:      Pupils: Pupils are equal, round, and reactive to light. Cardiovascular:      Rate and Rhythm: Normal rate and regular rhythm. Pulses: Normal pulses.       Heart sounds: Normal heart sounds. Pulmonary:      Effort: Pulmonary effort is normal.      Breath sounds: Normal breath sounds. Abdominal:      General: Bowel sounds are normal.   Genitourinary:     Comments: Declined gu exam, does self testicular exam  Musculoskeletal:         General: Normal range of motion. Cervical back: Normal range of motion. Skin:     General: Skin is warm and dry. Capillary Refill: Capillary refill takes less than 2 seconds. Neurological:      General: No focal deficit present. Mental Status: He is alert and oriented to person, place, and time. Psychiatric:         Mood and Affect: Mood normal.         Behavior: Behavior normal.         Thought Content: Thought content normal.         Judgment: Judgment normal.     BP (!) 140/72 (Site: Left Upper Arm, Position: Sitting, Cuff Size: Large Adult)   Pulse (!) 114   Temp 98.4 °F (36.9 °C) (Temporal)   Resp 18   Ht 6' (1.829 m)   Wt 184 lb 12.8 oz (83.8 kg)   SpO2 96%   BMI 25.06 kg/m²     Assessment:       Diagnosis Orders   1. Abnormal wellness exam        2. Premature ejaculation  Testosterone Free and Total Male      3. Chronic hiccups  gabapentin (NEURONTIN) 100 MG capsule            Plan:     He has a controlled substance contract with [facility] in his chart. I did do a Adalberto Dural which did not reveal anything abnormal.  I am going to go ahead and do gabapentin to see if we can help him with these hiccups.   PDMP Monitoring:    Last PDMP [place] as Reviewed:  Review User Review Instant Review Result            Urine Drug Screenings (1 yr)       URINE DRUG SCREEN  Collected: 3/14/2020 11:20 AM (Final result)              Cannabinoid, Urine, Confirmation  Collected: 7/5/2022  3:28 PM (Final result)                  Medication Contract and Consent for Opioid Use Documents Filed       Patient Documents       Type of Document Status Date Received Received By Description    Medication Contract Received 7/18/2022  3:01 PM Thai Sahni Medication Contract Received 1/25/2023  3:30 PM Alexandra José                      Patient given educational materials -see patient instructions. Discussed use, benefit, and side effects of prescribed medications. All patient questions answered. Pt voiced understanding. Reviewed health maintenance. Instructed to continue currentmedications, diet and exercise. Patient agreed with treatment plan. Follow up as directed. MEDICATIONS:  Orders Placed This Encounter   Medications    gabapentin (NEURONTIN) 100 MG capsule     Sig: Take 1 capsule by mouth 3 times daily for 30 days. Intended supply: 30 days Max Daily Amount: 300 mg     Dispense:  90 capsule     Refill:  5         ORDERS:  Orders Placed This Encounter   Procedures    Testosterone Free and Total Male       Follow-up:  Return in about 6 months (around 9/16/2023). PATIENT INSTRUCTIONS:  Patient Instructions   Do labs testosterone for the premature EJ  If normal send to urology  Start the gabapentin for hicups 3 times a day , may cause some drowisness, if not improving after 2 weeks double dose. Let me know  If hiccups not better [facility]   Electronically signed by DAKOTAH Blankenship CNP on 3/16/2023 at 3:13 PM    EMR Dragon/transcription disclaimer:  Much of thisencounter note is electronic transcription/translation of spoken language to printed texts. The electronic translation of spoken language may be erroneous, or at times, nonsensical words or phrases may be inadvertentlytranscribed.   Although I have reviewed the note for such errors, some may still exist.
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
## 2023-03-16 NOTE — TELEPHONE ENCOUNTER
Called and let pt know that his script for abilify was sent to his pharmacy     Electronically signed by Jong Rey on 3/16/2023 at 4:27 PM
1

## 2023-03-16 NOTE — PATIENT INSTRUCTIONS
Do labs testosterone for the premature EJ  If normal send to urology  Start the gabapentin for hicups 3 times a day , may cause some drowisness, if not improving after 2 weeks double dose.  Let me know  If hiccups not better refer to [facility]

## 2023-03-21 LAB
SHBG SERPL-SCNC: 30 NMOL/L (ref 11–80)
SHBG SERPL-SCNC: 70 PG/ML (ref 47–244)
TESTOST SERPL-MCNC: 333 NG/DL (ref 220–1000)

## 2023-03-21 RX ORDER — OMEPRAZOLE 40 MG/1
40 CAPSULE, DELAYED RELEASE ORAL DAILY
Qty: 90 CAPSULE | Refills: 3 | Status: SHIPPED | OUTPATIENT
Start: 2023-03-21

## 2023-04-28 ENCOUNTER — TELEPHONE (OUTPATIENT)
Dept: PSYCHIATRY | Age: 23
End: 2023-04-28

## 2023-04-28 NOTE — TELEPHONE ENCOUNTER
Called pt on 04/27/23 to remind them of their appt for 05/01/23    -Pt confirmed    Electronically signed by Darnelle Hammans, MA on 4/28/2023 at 9:50 AM

## 2023-05-01 ENCOUNTER — TELEPHONE (OUTPATIENT)
Dept: PSYCHIATRY | Age: 23
End: 2023-05-01

## 2023-05-01 RX ORDER — ARIPIPRAZOLE 15 MG/1
7.5 TABLET ORAL DAILY
Qty: 15 TABLET | Refills: 2 | Status: SHIPPED | OUTPATIENT
Start: 2023-05-01

## 2023-05-01 NOTE — TELEPHONE ENCOUNTER
Called and let pt know that his script for abilify was sent to her pharmacy     Electronically signed by Hakan Dale on 5/1/2023 at 4:27 PM
patient

## 2023-05-01 NOTE — TELEPHONE ENCOUNTER
Frances Servin called to request a refill on his medication. Last office visit : 1/25/2023 Boris CLAIRE  Next office visit : 5/9/2023 Boris CLAIRE    Requested Prescriptions     Pending Prescriptions Disp Refills    ARIPiprazole (ABILIFY) 15 MG tablet 15 tablet 2     Sig: Take 0.5 tablets by mouth daily            Skyler Najera     1/25/23                                          Progress Note     Frances Gareth 2000                            Chief Complaint   Patient presents with    Medication Check    Follow-up               Subjective:    Patient is a 25 y.o. male diagnosed with Mood disorder and presents today for follow-up. Last seen in clinic on 10/17/22  and prior records were reviewed. Last visit: [place]ies to [employer]. 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  He is still hiccupping however her has an appointment in the next few days for a consult, possible surgical intervention. He has not had any issues with agitation or aggression. He reports depression and anxiety are ok. He has been spacing out and \"disassociating\" this is not happening while on the job. He is able to maintain focus and concentration when needed. He tends to get distracted or zoned out mostly at home. He reports things are going well with his wife. He is bright calm and cooperative. He states he is having some issue with his anxiety and focus, he requests an increase in abilify at this time. We discussed starting a low dose of SSRI like lexapro however he knows he tolerates abilify well and is hesitant to begin a new medication at this time. Abilify was increased to 7.5 mg, he was encouraged to contact the office should his symptoms worse. He verbalized understanding and agreement. Will follow up in 3 months.      Today : he has followed up with primary and he is unsure of what can be done about his hiccups at

## 2023-05-09 ENCOUNTER — OFFICE VISIT (OUTPATIENT)
Dept: PSYCHIATRY | Age: 23
End: 2023-05-09

## 2023-05-09 VITALS
WEIGHT: 184 LBS | TEMPERATURE: 98.6 F | RESPIRATION RATE: 18 BRPM | OXYGEN SATURATION: 100 % | SYSTOLIC BLOOD PRESSURE: 144 MMHG | BODY MASS INDEX: 24.95 KG/M2 | HEART RATE: 66 BPM | DIASTOLIC BLOOD PRESSURE: 85 MMHG

## 2023-05-09 DIAGNOSIS — F39 MOOD DISORDER (HCC): Primary | ICD-10-CM

## 2023-05-09 ASSESSMENT — PATIENT HEALTH QUESTIONNAIRE - PHQ9
SUM OF ALL RESPONSES TO PHQ QUESTIONS 1-9: 0
10. IF YOU CHECKED OFF ANY PROBLEMS, HOW DIFFICULT HAVE THESE PROBLEMS MADE IT FOR YOU TO DO YOUR WORK, TAKE CARE OF THINGS AT HOME, OR GET ALONG WITH OTHER PEOPLE: 0
9. THOUGHTS THAT YOU WOULD BE BETTER OFF DEAD, OR OF HURTING YOURSELF: 0
7. TROUBLE CONCENTRATING ON THINGS, SUCH AS READING THE NEWSPAPER OR WATCHING TELEVISION: 0
SUM OF ALL RESPONSES TO PHQ9 QUESTIONS 1 & 2: 0
SUM OF ALL RESPONSES TO PHQ QUESTIONS 1-9: 0
1. LITTLE INTEREST OR PLEASURE IN DOING THINGS: 0
SUM OF ALL RESPONSES TO PHQ QUESTIONS 1-9: 0
4. FEELING TIRED OR HAVING LITTLE ENERGY: 0
3. TROUBLE FALLING OR STAYING ASLEEP: 0
SUM OF ALL RESPONSES TO PHQ QUESTIONS 1-9: 0
8. MOVING OR SPEAKING SO SLOWLY THAT OTHER PEOPLE COULD HAVE NOTICED. OR THE OPPOSITE, BEING SO FIGETY OR RESTLESS THAT YOU HAVE BEEN MOVING AROUND A LOT MORE THAN USUAL: 0
2. FEELING DOWN, DEPRESSED OR HOPELESS: 0
6. FEELING BAD ABOUT YOURSELF - OR THAT YOU ARE A FAILURE OR HAVE LET YOURSELF OR YOUR FAMILY DOWN: 0
5. POOR APPETITE OR OVEREATING: 0

## 2023-05-09 NOTE — PROGRESS NOTES
office. 8. The above listed medications have been continued, modifications in meds and other orders/labs as follows: No orders of the defined types were placed in this encounter. No orders of the defined types were placed in this encounter. 9. Additional comments: , discussed sleep hygiene, discussed the use of coping skills and relaxation strategies to manage symptoms. 10.Over 50% of the total visit time of   30  minutes was spent on counseling and/or coordination of care of:                        1. Mood disorder [facility])                          Psychotherapy Topics: mood/medication effectiveness family, health, and occupational    Vandana Garcia, APRN - CNP    This dictation was generated by voice recognition computer software. Although all attempts are made to edit the dictation for accuracy, there may be errors in the transcription that are not intended.

## 2023-05-10 ENCOUNTER — TELEPHONE (OUTPATIENT)
Dept: PSYCHIATRY | Age: 23
End: 2023-05-10

## 2023-05-10 NOTE — TELEPHONE ENCOUNTER
Called pt for appointment reminder.     -Pt confirmed      Electronically signed by Alysa Mccall MA on 5/10/2023 at 8:21 AM

## 2023-05-25 RX ORDER — TRAZODONE HYDROCHLORIDE 50 MG/1
TABLET ORAL
Qty: 60 TABLET | Refills: 5 | Status: SHIPPED | OUTPATIENT
Start: 2023-05-25

## 2023-06-28 RX ORDER — OMEPRAZOLE 40 MG/1
40 CAPSULE, DELAYED RELEASE ORAL DAILY
Qty: 90 CAPSULE | Refills: 3 | Status: SHIPPED | OUTPATIENT
Start: 2023-06-28

## 2023-07-27 RX ORDER — OMEPRAZOLE 40 MG/1
40 CAPSULE, DELAYED RELEASE ORAL DAILY
Qty: 90 CAPSULE | Refills: 3 | OUTPATIENT
Start: 2023-07-27

## 2023-07-27 NOTE — TELEPHONE ENCOUNTER
Last visit [id number] [facility] aprn 11-14-22. No FU scheduled. Egd  10-21-22. This RF requested has already been ES to [facility] by JARVIS cox on 6-28-23 at one daily # 90 x 3 additional refills, Rx is good till 6-2024 ? [address] notified.   mehran

## 2023-08-04 ENCOUNTER — TELEPHONE (OUTPATIENT)
Dept: PSYCHIATRY | Age: 23
End: 2023-08-04

## 2023-08-04 NOTE — TELEPHONE ENCOUNTER
Called and confirmed appt with pt for 8/7 @ 2 PM    Electronically signed by Sara Skelton on 8/4/2023 at 4:07 PM

## 2023-08-07 ENCOUNTER — OFFICE VISIT (OUTPATIENT)
Dept: PSYCHIATRY | Age: 23
End: 2023-08-07
Payer: MEDICAID

## 2023-08-07 VITALS
BODY MASS INDEX: 23.49 KG/M2 | HEART RATE: 77 BPM | TEMPERATURE: 97.9 F | HEIGHT: 73 IN | WEIGHT: 177.2 LBS | SYSTOLIC BLOOD PRESSURE: 104 MMHG | OXYGEN SATURATION: 96 % | DIASTOLIC BLOOD PRESSURE: 63 MMHG

## 2023-08-07 DIAGNOSIS — F39 MOOD DISORDER (HCC): Primary | ICD-10-CM

## 2023-08-07 PROCEDURE — G8428 CUR MEDS NOT DOCUMENT: HCPCS | Performed by: NURSE PRACTITIONER

## 2023-08-07 PROCEDURE — G8420 CALC BMI NORM PARAMETERS: HCPCS | Performed by: NURSE PRACTITIONER

## 2023-08-07 PROCEDURE — 99214 OFFICE O/P EST MOD 30 MIN: CPT | Performed by: NURSE PRACTITIONER

## 2023-08-07 PROCEDURE — 1036F TOBACCO NON-USER: CPT | Performed by: NURSE PRACTITIONER

## 2023-08-07 ASSESSMENT — PATIENT HEALTH QUESTIONNAIRE - PHQ9
SUM OF ALL RESPONSES TO PHQ QUESTIONS 1-9: 0
SUM OF ALL RESPONSES TO PHQ QUESTIONS 1-9: 0
2. FEELING DOWN, DEPRESSED OR HOPELESS: 0
5. POOR APPETITE OR OVEREATING: 0
SUM OF ALL RESPONSES TO PHQ QUESTIONS 1-9: 0
9. THOUGHTS THAT YOU WOULD BE BETTER OFF DEAD, OR OF HURTING YOURSELF: 0
4. FEELING TIRED OR HAVING LITTLE ENERGY: 0
6. FEELING BAD ABOUT YOURSELF - OR THAT YOU ARE A FAILURE OR HAVE LET YOURSELF OR YOUR FAMILY DOWN: 0
1. LITTLE INTEREST OR PLEASURE IN DOING THINGS: 0
SUM OF ALL RESPONSES TO PHQ QUESTIONS 1-9: 0
SUM OF ALL RESPONSES TO PHQ9 QUESTIONS 1 & 2: 0
3. TROUBLE FALLING OR STAYING ASLEEP: 0
7. TROUBLE CONCENTRATING ON THINGS, SUCH AS READING THE NEWSPAPER OR WATCHING TELEVISION: 0
8. MOVING OR SPEAKING SO SLOWLY THAT OTHER PEOPLE COULD HAVE NOTICED. OR THE OPPOSITE, BEING SO FIGETY OR RESTLESS THAT YOU HAVE BEEN MOVING AROUND A LOT MORE THAN USUAL: 0
10. IF YOU CHECKED OFF ANY PROBLEMS, HOW DIFFICULT HAVE THESE PROBLEMS MADE IT FOR YOU TO DO YOUR WORK, TAKE CARE OF THINGS AT HOME, OR GET ALONG WITH OTHER PEOPLE: 0

## 2023-08-07 NOTE — PROGRESS NOTES
discharge)    Musculoskeletal: (bone pain/fracture, joint pain or swelling, musle pain)    Integumentary: (rashes, acne, non-healing sores, itching, breast lumps, breast pain, nipple discharge, hair loss)    Neurologic: (HA, muscle weakness, paresthesias (numbness, coldness, crawling or prickling), memory loss, seizure, dizziness)    Psychiatric:  (anxiety, sadness, irritability/anger, insomnia, suicidality)    Endocrine: (heat or cold intolerance, excessive thirst (polydipsia), excessive hunger (polyphagia))    Immune/Allergic: (hives, seasonal or environmental allergies, HIV exposure)    Hematologic/Lymphatic: (lymph node enlargement, easy bleeding or bruising)    History obtained via chart review and patient    PCP is DAKOTAH Hood CNP       Current Meds:    Prior to Admission medications    Medication Sig Start Date End Date Taking? Authorizing Provider   omeprazole (PRILOSEC) 40 MG delayed release capsule Take 1 capsule by mouth daily 23   DAKOTAH Panchal   traZODone (DESYREL) 50 MG tablet 1-2 PO at HS for sleep and depression 23   DAKOTAH Hood CNP   ARIPiprazole (ABILIFY) 15 MG tablet Take 0.5 tablets by mouth daily 23   DAKOTAH Bolaños CNP   gabapentin (NEURONTIN) 100 MG capsule Take 1 capsule by mouth 3 times daily for 30 days. Intended supply: 30 days Max Daily Amount: 300 mg 3/16/23 4/15/23  DAKOTAH Hood CNP   sucralfate (CARAFATE) 1 GM tablet Take 1 tablet by mouth 4 times daily as needed 22   DAKOTAH Hung NP   Daridorexant HCl (QUVIVIQ) 50 MG TABS 1 p.o. 30 minutes before bedtime. Patient taking differently: as needed 1 p.o. 30 minutes before bedtime.  9/15/22   DAKOTAH Hood CNP     Social History     Socioeconomic History    Marital status:    Tobacco Use    Smoking status: Former     Years: 2.00     Types: Cigarettes     Quit date:      Years since quittin.6    Smokeless tobacco: Former

## 2023-08-24 ENCOUNTER — TELEPHONE (OUTPATIENT)
Dept: PSYCHIATRY | Age: 23
End: 2023-08-24

## 2023-08-24 RX ORDER — ARIPIPRAZOLE 15 MG/1
TABLET ORAL
Qty: 15 TABLET | Refills: 2 | OUTPATIENT
Start: 2023-08-24

## 2023-08-24 RX ORDER — TRAZODONE HYDROCHLORIDE 50 MG/1
TABLET ORAL
Qty: 60 TABLET | Refills: 5 | Status: SHIPPED | OUTPATIENT
Start: 2023-08-24

## 2023-08-24 RX ORDER — ARIPIPRAZOLE 15 MG/1
7.5 TABLET ORAL DAILY
Qty: 15 TABLET | Refills: 2 | Status: SHIPPED | OUTPATIENT
Start: 2023-08-24

## 2023-08-24 NOTE — TELEPHONE ENCOUNTER
Pharmacy sent a request to refill pt's medication       Last office visit : 8/7/2023 Annabelle CLAIRE  Next office visit : 8/24/2023 Annabelle Falk DAKOTAH    Requested Prescriptions     Pending Prescriptions Disp Refills    ARIPiprazole (ABILIFY) 15 MG tablet 15 tablet 2     Sig: Take 0.5 tablets by mouth daily            Skyler Najera     8/7/23                                           Progress Note     Lavon Yates 2000                            Chief Complaint   Patient presents with    Medication Check    Follow-up               Subjective:    Patient is a 25 y.o. male diagnosed with Mood disorder and presents today for follow-up. Last seen in clinic on 5/9/23   and prior records were reviewed. Last visit:  he reports he is doing well. 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 He still has hiccups, he has tried to follow up with specialists but he is unsure of what can be done further. He says insurance could be an issue as to why his hiccups have not been addressed. He recounted a couple of instances where some psychosocial stressors have made him agitated and angry, supportive psychotherapy provided at this time. Patient reports that he has been managing his anxiety and agitation well. He is going fishing tonight with his wife and his best friend. He became very excited about going on this fishing trip. He is bright calm and cooperative he denies SI HI AVH she denies side effects of medications at this time we will follow-up in 3 months     Today : he says he is doing well. 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 He has been busy this summer as well. He has a concert in [facility] coming up in September that he is really looking forward to. Overall he states he has been doing well. He says he gets agitated at times but feels it is appropriate. He has a stressful job but feels like he manages his stress well.   He is bright, and cooperative he

## 2023-08-24 NOTE — TELEPHONE ENCOUNTER
Called and let pot know that his script for abilify was sent to his pharmacy     Electronically signed by Wilian Ding on 8/24/2023 at 4:42 PM

## 2023-09-22 ENCOUNTER — OFFICE VISIT (OUTPATIENT)
Dept: PRIMARY CARE CLINIC | Age: 23
End: 2023-09-22
Payer: MEDICAID

## 2023-09-22 VITALS
WEIGHT: 185 LBS | DIASTOLIC BLOOD PRESSURE: 63 MMHG | TEMPERATURE: 97.8 F | RESPIRATION RATE: 16 BRPM | SYSTOLIC BLOOD PRESSURE: 100 MMHG | HEIGHT: 72 IN | BODY MASS INDEX: 25.06 KG/M2 | OXYGEN SATURATION: 97 % | HEART RATE: 82 BPM

## 2023-09-22 DIAGNOSIS — R06.6 CHRONIC HICCUPS: ICD-10-CM

## 2023-09-22 PROCEDURE — G8427 DOCREV CUR MEDS BY ELIG CLIN: HCPCS | Performed by: NURSE PRACTITIONER

## 2023-09-22 PROCEDURE — 1036F TOBACCO NON-USER: CPT | Performed by: NURSE PRACTITIONER

## 2023-09-22 PROCEDURE — 99213 OFFICE O/P EST LOW 20 MIN: CPT | Performed by: NURSE PRACTITIONER

## 2023-09-22 PROCEDURE — G8419 CALC BMI OUT NRM PARAM NOF/U: HCPCS | Performed by: NURSE PRACTITIONER

## 2023-09-22 RX ORDER — GABAPENTIN 100 MG/1
CAPSULE ORAL
Qty: 90 CAPSULE | Refills: 5 | Status: SHIPPED | OUTPATIENT
Start: 2023-09-22 | End: 2023-10-25

## 2023-09-22 NOTE — PATIENT INSTRUCTIONS
Continue with [facility]  Try to get consistent with the gabapentin  Refer to [facility] in [place].

## 2023-09-28 RX ORDER — ARIPIPRAZOLE 15 MG/1
7.5 TABLET ORAL DAILY
Qty: 15 TABLET | Refills: 2 | OUTPATIENT
Start: 2023-09-28

## 2023-09-29 DIAGNOSIS — R06.6 CHRONIC HICCUPS: Primary | ICD-10-CM

## 2023-11-01 ENCOUNTER — OFFICE VISIT (OUTPATIENT)
Age: 23
End: 2023-11-01
Payer: MEDICAID

## 2023-11-01 VITALS
OXYGEN SATURATION: 96 % | RESPIRATION RATE: 20 BRPM | SYSTOLIC BLOOD PRESSURE: 114 MMHG | HEART RATE: 84 BPM | WEIGHT: 176 LBS | HEIGHT: 73 IN | BODY MASS INDEX: 23.33 KG/M2 | TEMPERATURE: 98.4 F | DIASTOLIC BLOOD PRESSURE: 70 MMHG

## 2023-11-01 DIAGNOSIS — R05.1 ACUTE COUGH: ICD-10-CM

## 2023-11-01 DIAGNOSIS — R19.7 DIARRHEA, UNSPECIFIED TYPE: ICD-10-CM

## 2023-11-01 DIAGNOSIS — B34.9 VIRAL ILLNESS: Primary | ICD-10-CM

## 2023-11-01 DIAGNOSIS — R09.81 SINUS CONGESTION: ICD-10-CM

## 2023-11-01 DIAGNOSIS — R11.2 NAUSEA AND VOMITING, UNSPECIFIED VOMITING TYPE: ICD-10-CM

## 2023-11-01 LAB
INFLUENZA A ANTIBODY: NORMAL
INFLUENZA B ANTIBODY: NORMAL
S PYO AG THROAT QL: NORMAL

## 2023-11-01 PROCEDURE — G8427 DOCREV CUR MEDS BY ELIG CLIN: HCPCS

## 2023-11-01 PROCEDURE — 1036F TOBACCO NON-USER: CPT

## 2023-11-01 PROCEDURE — G8420 CALC BMI NORM PARAMETERS: HCPCS

## 2023-11-01 PROCEDURE — 99213 OFFICE O/P EST LOW 20 MIN: CPT

## 2023-11-01 PROCEDURE — G8484 FLU IMMUNIZE NO ADMIN: HCPCS

## 2023-11-01 RX ORDER — ONDANSETRON 4 MG/1
4 TABLET, ORALLY DISINTEGRATING ORAL 3 TIMES DAILY PRN
Qty: 21 TABLET | Refills: 0 | Status: SHIPPED | OUTPATIENT
Start: 2023-11-01

## 2023-11-01 ASSESSMENT — ENCOUNTER SYMPTOMS
NAUSEA: 1
EYE PAIN: 0
CHEST TIGHTNESS: 0
TROUBLE SWALLOWING: 0
CHOKING: 0
VOMITING: 1
CONSTIPATION: 0
SHORTNESS OF BREATH: 0
ABDOMINAL DISTENTION: 0
EYE DISCHARGE: 0
COLOR CHANGE: 0
COUGH: 1
EYE REDNESS: 0
DIARRHEA: 1
SINUS PAIN: 0
STRIDOR: 0
SORE THROAT: 0
SINUS PRESSURE: 1
APNEA: 0
ABDOMINAL PAIN: 0
FACIAL SWELLING: 0
WHEEZING: 0

## 2023-11-01 NOTE — PROGRESS NOTES
[address]  [address] [place] [address]  Dept: [phone]  Dept Fax: [phone]  Loc: [phone]    Gayle Hinkle is a 21 y.o. male who presents today for his medical conditions/complaints as noted below. Gayle Hinkle is complaining of Cough (Took at home covid test = Negative), Congestion, Nausea & Vomiting (Last 2 days), and Diarrhea        HPI:   Pt presents with c/o sinus congestion, mild cough, N/V/D, fatigue, and malaise x a couple of days. Says he is feeling better today than he was yesterday but wants to be tested for flu/strep/covid. Denies fever, chills. No meds, alleviating factors reported. S/s moderate. stable    Cough  Pertinent negatives include no chest pain, chills, ear pain, eye redness, fever, headaches, myalgias, postnasal drip, rash, sore throat, shortness of breath or wheezing. Nausea & Vomiting  Associated symptoms include congestion, coughing (mild), fatigue, nausea and vomiting. Pertinent negatives include no abdominal pain, arthralgias, chest pain, chills, diaphoresis, fever, headaches, joint swelling, myalgias, numbness, rash or sore throat. Diarrhea   Associated symptoms include coughing (mild) and vomiting. Pertinent negatives include no abdominal pain, arthralgias, chills, fever, headaches or myalgias.        Past Medical History:   Diagnosis Date    Anxiety     Chronic hiccups     Depression     GERD (gastroesophageal reflux disease)        Past Surgical History:   Procedure Laterality Date    ESOPHAGOGASTRODUODENOSCOPY  2015    heartburn damaged esoph per patient    TONSILLECTOMY      UPPER GASTROINTESTINAL ENDOSCOPY N/A 10/21/2022    Dr Jose Bartholomew, sm 1-2 mm erosions in antrum-sugg of mild gastritis, GERD, no h pylori       Family History   Problem Relation Age of Onset    High Blood Pressure Mother     Cancer Mother         cervical cancer    Hypertension Father     Bipolar Disorder Father

## 2023-11-01 NOTE — PATIENT INSTRUCTIONS
Strep and flu tests were negative    Covid test sent to lab; will call with results    Zofran ODT as needed for nausea and vomiting prescribed. Encouraged fluids, bland foods (such as dry toast, plain baked potato, chicken noodle soup), rest, and monitor for signs/symptoms of dehydration. Advised patient that etiology is likely viral and symptoms may last 2-3 days. If not improving in 2-3 days, follow up with PCP. Report to ER if symptoms worsening or become severe.      Note for today and tomorrow

## 2024-02-07 ENCOUNTER — TELEPHONE (OUTPATIENT)
Dept: PSYCHIATRY | Age: 24
End: 2024-02-07

## 2024-02-07 NOTE — TELEPHONE ENCOUNTER
Called patient to remind them of their appointment   -Pt confirmed    Reminded patient to complete their visit pre-check/digital registration in [employer].    Electronically signed by Mimi Coy MA on 2/7/2024 at 1:45 PM

## 2024-02-09 ENCOUNTER — TELEPHONE (OUTPATIENT)
Dept: PSYCHIATRY | Age: 24
End: 2024-02-09

## 2024-02-09 NOTE — TELEPHONE ENCOUNTER
[facility] no showed [facility] Office called the patient to check on them and to reschedule their appointment.     Patient's appointment was rescheduled. Discussed the three no show and dismissal policies with the patient. Provided education that after three no show, patients can be dismissed by the provider and practice. Patient verbally understood. Barriers to treatment was discussed with the patient.    Discussed the three late cancellation and dismissal policies with the patient. Provided education that after three late cancellations, patients can be dismissed by the provider and practice. Patient verbally understood.    Barriers to treatment: Patient reported no barriers to treatment.    Rescheduled for 02/14/24 @ 2:00.    Electronically signed by Cyndi Dover MA on 2/9/2024 at 4:25 PM

## 2024-02-13 ENCOUNTER — TELEPHONE (OUTPATIENT)
Dept: PSYCHIATRY | Age: 24
End: 2024-02-13

## 2024-02-13 NOTE — TELEPHONE ENCOUNTER
Called patient to remind them of their appointment for 02/14/24 @ 2 for in office appt with Braxton Garza    -Pt confirmed      Electronically signed by Cyndi Dover MA on 2/13/2024 at 11:53 AM

## 2024-02-14 ENCOUNTER — OFFICE VISIT (OUTPATIENT)
Dept: PSYCHIATRY | Age: 24
End: 2024-02-14
Payer: MEDICAID

## 2024-02-14 VITALS
OXYGEN SATURATION: 97 % | HEIGHT: 72 IN | HEART RATE: 106 BPM | BODY MASS INDEX: 25.06 KG/M2 | DIASTOLIC BLOOD PRESSURE: 76 MMHG | WEIGHT: 185 LBS | SYSTOLIC BLOOD PRESSURE: 124 MMHG

## 2024-02-14 DIAGNOSIS — F39 MOOD DISORDER (HCC): Primary | ICD-10-CM

## 2024-02-14 PROCEDURE — 1036F TOBACCO NON-USER: CPT | Performed by: NURSE PRACTITIONER

## 2024-02-14 PROCEDURE — G8419 CALC BMI OUT NRM PARAM NOF/U: HCPCS | Performed by: NURSE PRACTITIONER

## 2024-02-14 PROCEDURE — G8428 CUR MEDS NOT DOCUMENT: HCPCS | Performed by: NURSE PRACTITIONER

## 2024-02-14 PROCEDURE — G8484 FLU IMMUNIZE NO ADMIN: HCPCS | Performed by: NURSE PRACTITIONER

## 2024-02-14 PROCEDURE — 99214 OFFICE O/P EST MOD 30 MIN: CPT | Performed by: NURSE PRACTITIONER

## 2024-02-14 RX ORDER — TRAZODONE HYDROCHLORIDE 50 MG/1
50 TABLET ORAL NIGHTLY
Qty: 90 TABLET | Refills: 1 | Status: SHIPPED | OUTPATIENT
Start: 2024-02-14

## 2024-02-14 NOTE — PROGRESS NOTES
2/14/24           Progress Note    Micha Heardgild[address] 2000      Chief Complaint   Patient presents with    Medication Check    Follow-up           Subjective:    Patient is a 23 y.o. male diagnosed with Mood disorder and presents today for follow-up.  Last seen in clinic on 8/7/23   and prior records were reviewed.    Last visit:  he says he is doing well.  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  He has been busy this summer as well.  He has a concert in [facility] coming up in September that he is really looking forward to.  Overall he states he has been doing well.  He says he gets agitated at times but feels it is appropriate.  He has a stressful job but feels like he manages his stress well.  He is bright, and cooperative he denies SI HI AVH she denies side effects of medications.  No medication adjustments at this time.  Requested follow-up in 6 months.  He is encouraged to contact the office with questions or concerns and/or updates.    Today : he has moved into his home.   He reports the holidays went very well.  He is bright calm and cooperative today.  He states he is doing well overall.  He is bright calm and cooperative, he denies si hi avh.  He has not been taking his medications for about 2 months.  He has not noticed much of a change.  He reports he has noticed some change in sleep habits and requests to keep trazodone.  Abilify was discontinued.  Will follow up in 6 months.    Absent  suicidal ideation.    Reports compliance with medications as good .     Sleep: difficulty staying asleep.    Appetite: eats a little more based on his mood, overall pretty consistent      Caffeine use: coffee in the morning, tea occasionally      Support: wife,  aunt, grandparents    PREVIOUS MED TRIALS  Prozac  Ativan     SUBSTANCE USE HISTORY  Alcohol: couple of drinks a week  Illicit drug use: history of using meth, sober for 1 year  Marijuana: once or twice a week  Tobacco: denies   Vape: 50

## 2024-06-26 ENCOUNTER — TELEPHONE (OUTPATIENT)
Dept: PSYCHIATRY | Age: 24
End: 2024-06-26

## 2024-06-26 NOTE — TELEPHONE ENCOUNTER
Called pt to cancel/reschedule appt for 08/14/24 with Braxton Garza because the provider will not be in the office that day.    No answer and LVM.  Cancelled appt.    Electronically signed by Cyndi Dover MA on 6/26/2024 at 4:25 PM

## 2024-09-20 ENCOUNTER — TELEPHONE (OUTPATIENT)
Dept: PSYCHIATRY | Age: 24
End: 2024-09-20

## 2025-08-29 ENCOUNTER — TELEPHONE (OUTPATIENT)
Dept: SURGERY | Facility: CLINIC | Age: 25
End: 2025-08-29
Payer: COMMERCIAL

## (undated) DEVICE — ENDO KIT,LOURDES HOSPITAL: Brand: MEDLINE INDUSTRIES, INC.

## (undated) DEVICE — FORCEPS BX L240CM JAW DIA2.4MM ORNG L CAP W/ NDL DISP RAD